# Patient Record
Sex: FEMALE | Race: WHITE | NOT HISPANIC OR LATINO | Employment: FULL TIME | URBAN - NONMETROPOLITAN AREA
[De-identification: names, ages, dates, MRNs, and addresses within clinical notes are randomized per-mention and may not be internally consistent; named-entity substitution may affect disease eponyms.]

---

## 2018-12-10 ENCOUNTER — OFFICE VISIT (OUTPATIENT)
Dept: PULMONOLOGY | Facility: CLINIC | Age: 33
End: 2018-12-10

## 2018-12-10 ENCOUNTER — LAB (OUTPATIENT)
Dept: LAB | Facility: HOSPITAL | Age: 33
End: 2018-12-10
Attending: INTERNAL MEDICINE

## 2018-12-10 VITALS
BODY MASS INDEX: 38.76 KG/M2 | DIASTOLIC BLOOD PRESSURE: 82 MMHG | SYSTOLIC BLOOD PRESSURE: 124 MMHG | OXYGEN SATURATION: 97 % | HEIGHT: 64 IN | HEART RATE: 111 BPM | WEIGHT: 227 LBS | RESPIRATION RATE: 18 BRPM

## 2018-12-10 DIAGNOSIS — J30.89 OTHER ALLERGIC RHINITIS: ICD-10-CM

## 2018-12-10 DIAGNOSIS — R06.02 SHORTNESS OF BREATH: Primary | ICD-10-CM

## 2018-12-10 DIAGNOSIS — J45.40 MODERATE PERSISTENT ASTHMA WITHOUT COMPLICATION: ICD-10-CM

## 2018-12-10 PROCEDURE — 36415 COLL VENOUS BLD VENIPUNCTURE: CPT

## 2018-12-10 PROCEDURE — 86003 ALLG SPEC IGE CRUDE XTRC EA: CPT

## 2018-12-10 PROCEDURE — 99245 OFF/OP CONSLTJ NEW/EST HI 55: CPT | Performed by: INTERNAL MEDICINE

## 2018-12-10 PROCEDURE — 95012 NITRIC OXIDE EXP GAS DETER: CPT | Performed by: INTERNAL MEDICINE

## 2018-12-10 PROCEDURE — 82785 ASSAY OF IGE: CPT

## 2018-12-10 RX ORDER — MONTELUKAST SODIUM 10 MG/1
TABLET ORAL
COMMUNITY
End: 2019-02-22 | Stop reason: SDUPTHER

## 2018-12-10 RX ORDER — BACLOFEN 10 MG/1
TABLET ORAL
Refills: 11 | COMMUNITY
Start: 2018-11-15

## 2018-12-10 RX ORDER — AZELASTINE HYDROCHLORIDE, FLUTICASONE PROPIONATE 137; 50 UG/1; UG/1
1 SPRAY, METERED NASAL DAILY
Qty: 1 BOTTLE | Refills: 5 | Status: SHIPPED | OUTPATIENT
Start: 2018-12-10 | End: 2019-02-22 | Stop reason: SDUPTHER

## 2018-12-10 NOTE — PROGRESS NOTES
CONSULT NOTE    Requested by:   Mckenna Wolf MD      Chief Complaint   Patient presents with   • Consult   • Shortness of Breath   • Asthma       Subjective   Tracy Pena is a 33 y.o. female.     History of Present Illness   Patient comes in today for consultation because of shortness of breath for the past few years    The patient says that her shortness of breath is more pronounced during the evening. It is also usually associated with wheezing. There have been some episodes of cough that usually follow activity.    The patient has pets at home, including 2 dogs and cats. There seems to be a seasonal variation to the symptoms.    She's currently on Dulera but continues to have cough, congestion and wheezing.    She says that dust also makes her symptoms worse.  She is a nonsmoker.  She reports recurrent bronchitis, 4-5 times per year.    Patient also complains of runny nose and dribbling in the back of the throat for the past few weeks. This has been sometimes associated with seasonal variation.    The following portions of the patient's history were reviewed and updated as appropriate: allergies, current medications, past family history, past medical history, past social history and past surgical history.    Review of Systems   Constitutional: Negative for chills, fatigue and fever.   HENT: Positive for ear pain, rhinorrhea, sore throat and voice change. Negative for sinus pressure.    Respiratory: Positive for cough, shortness of breath and wheezing. Negative for chest tightness.    Cardiovascular: Negative for chest pain, palpitations and leg swelling.   Psychiatric/Behavioral: Negative for sleep disturbance.   All other systems reviewed and are negative.      Past Medical History:   Diagnosis Date   • Asthma    • Bronchitis    • Pleurisy    • Pneumonia        Social History     Tobacco Use   • Smoking status: Never Smoker   Substance Use Topics   • Alcohol use: No     Frequency: Never  "        Objective   Visit Vitals  /82   Pulse 111   Resp 18   Ht 162.6 cm (64\")   Wt 103 kg (227 lb)   SpO2 97%   BMI 38.96 kg/m²       Physical Exam   Constitutional: She is oriented to person, place, and time. She appears well-developed.   HENT:   Nostril showed mild erythema.  Oropharynx was cobblestoned & moist.   Eyes: EOM are normal.   Neck: Neck supple. No JVD present.   Cardiovascular: Normal rate and regular rhythm.   Pulmonary/Chest: Effort normal and breath sounds normal. She has no wheezes. She has no rales.   Musculoskeletal:   Gait was normal.   Neurological: She is alert and oriented to person, place, and time.   Skin: Skin is warm and dry.   Psychiatric: She has a normal mood and affect. Her behavior is normal.   Vitals reviewed.      Assessment/Plan   Tracy was seen today for consult, shortness of breath and asthma.    Diagnoses and all orders for this visit:    Shortness of breath  -     Pulmonary Function Test; Future  -     Nitric Oxide  -     Peak Flow    Moderate persistent asthma without complication  -     Pulmonary Function Test; Future  -     Nitric Oxide  -     Peak Flow    Other allergic rhinitis  -     IgE; Future  -     Allergens, Zone 8; Future    Other orders  -     Azelastine-Fluticasone (DYMISTA) 137-50 MCG/ACT suspension; 1 spray into the nostril(s) as directed by provider Daily.  -     Tiotropium Bromide Monohydrate (SPIRIVA RESPIMAT) 1.25 MCG/ACT aerosol solution inhaler; Inhale 2 puffs Daily.         Return in about 10 weeks (around 2/18/2019) for Recheck, PFT on day of F/Up, [place]    DISCUSSION(if any):  I have reviewed the patient's pulmonary function test independently.  They were performed 2016 and was not acceptable for ATS criteria although did suggest obstructive defect, possibly moderate to severe.     Her last hemoglobin was 15.6 with absolute eosinophil count of 110    FeNO level was 18 today.    Peak flow was 275 LPM " today.    Spirometry with bronchodilator response will be obtained upon follow-up visit.    ===========================  ===========================    Patient will be started on nasal spray for symptoms which are definitely consistent with allergic rhinitis.     I have ordered IgE/RAST panel.    I had a detailed discussion with the patient regarding her symptoms which are very suggestive of poorly controlled asthma    She was started on Spiriva with instructions to continue inhaled cortical steroid/long-acting beta agonist    The patient was advised to continueSingulair    Other contributing factors may also need to be treated and this will be discussed with the patient, if and when applicable    Patient was advised to use albuterol based rescue inhaler for when necessary purposes    Patient was also advised to keep a log of the use of rescue inhaler.    She was also advised to keep a close eye on peak flow readings and to maintain record of any significant changes in it.    Patient was given reading material.      Dictated utilizing Dragon dictation.    This document was electronically signed by Woody Albert MD December 10, 2018  3:25 PM

## 2018-12-13 LAB
A ALTERNATA IGE QN: <0.1 KU/L
A FUMIGATUS IGE QN: <0.1 KU/L
AMER ROACH IGE QN: 0.22 KU/L
BAHIA GRASS IGE QN: <0.1 KU/L
BERMUDA GRASS IGE QN: <0.1 KU/L
BOXELDER IGE QN: <0.1 KU/L
C HERBARUM IGE QN: <0.1 KU/L
CAT DANDER IGG QN: <0.1 KU/L
CMN PIGWEED IGE QN: <0.1 KU/L
COMMON RAGWEED IGE QN: <0.1 KU/L
CONV CLASS DESCRIPTION: ABNORMAL
D FARINAE IGE QN: <0.1 KU/L
D PTERONYSS IGE QN: <0.1 KU/L
DOG DANDER IGE QN: <0.1 KU/L
ENGL PLANTAIN IGE QN: <0.1 KU/L
HAZELNUT POLN IGE QN: <0.1 KU/L
JOHNSON GRASS IGE QN: <0.1 KU/L
KENT BLUE GRASS IGE QN: <0.1 KU/L
M RACEMOSUS IGE QN: <0.1 KU/L
MT JUNIPER IGE QN: <0.1 KU/L
MUGWORT IGE QN: <0.1 KU/L
NETTLE IGE QN: <0.1 KU/L
P NOTATUM IGE QN: <0.1 KU/L
S BOTRYOSUM IGE QN: <0.1 KU/L
SHEEP SORREL IGE QN: <0.1 KU/L
SWEET GUM IGE QN: <0.1 KU/L
T011-IGE MAPLE LEAF SYCAMORE: <0.1 KU/L
TOTAL IGE SMQN RAST: 92 IU/ML (ref 0–100)
WHITE ELM IGE QN: <0.1 KU/L
WHITE HICKORY IGE QN: <0.1 KU/L
WHITE MULBERRY IGE QN: <0.1 KU/L
WHITE OAK IGE QN: <0.1 KU/L

## 2019-02-22 ENCOUNTER — OFFICE VISIT (OUTPATIENT)
Dept: PULMONOLOGY | Facility: CLINIC | Age: 34
End: 2019-02-22

## 2019-02-22 VITALS
RESPIRATION RATE: 18 BRPM | BODY MASS INDEX: 39.27 KG/M2 | DIASTOLIC BLOOD PRESSURE: 80 MMHG | HEART RATE: 91 BPM | SYSTOLIC BLOOD PRESSURE: 128 MMHG | OXYGEN SATURATION: 98 % | WEIGHT: 230 LBS | HEIGHT: 64 IN

## 2019-02-22 DIAGNOSIS — R06.02 SOB (SHORTNESS OF BREATH): Primary | ICD-10-CM

## 2019-02-22 DIAGNOSIS — J30.89 OTHER ALLERGIC RHINITIS: Primary | ICD-10-CM

## 2019-02-22 DIAGNOSIS — J45.40 MODERATE PERSISTENT ASTHMA WITHOUT COMPLICATION: ICD-10-CM

## 2019-02-22 DIAGNOSIS — R06.02 SHORTNESS OF BREATH: ICD-10-CM

## 2019-02-22 PROCEDURE — 94060 EVALUATION OF WHEEZING: CPT | Performed by: INTERNAL MEDICINE

## 2019-02-22 PROCEDURE — 99214 OFFICE O/P EST MOD 30 MIN: CPT | Performed by: NURSE PRACTITIONER

## 2019-02-22 RX ORDER — AZELASTINE HYDROCHLORIDE, FLUTICASONE PROPIONATE 137; 50 UG/1; UG/1
1 SPRAY, METERED NASAL DAILY
Qty: 3 BOTTLE | Refills: 1 | Status: SHIPPED | OUTPATIENT
Start: 2019-02-22 | End: 2019-10-08 | Stop reason: SDUPTHER

## 2019-02-22 RX ORDER — MONTELUKAST SODIUM 10 MG/1
10 TABLET ORAL NIGHTLY
Qty: 90 TABLET | Refills: 1 | Status: SHIPPED | OUTPATIENT
Start: 2019-02-22 | End: 2019-08-21 | Stop reason: SDUPTHER

## 2019-08-21 RX ORDER — MONTELUKAST SODIUM 10 MG/1
TABLET ORAL
Qty: 90 TABLET | Refills: 1 | Status: SHIPPED | OUTPATIENT
Start: 2019-08-21 | End: 2019-10-08 | Stop reason: SDUPTHER

## 2019-10-08 ENCOUNTER — OFFICE VISIT (OUTPATIENT)
Dept: PULMONOLOGY | Facility: CLINIC | Age: 34
End: 2019-10-08

## 2019-10-08 VITALS
OXYGEN SATURATION: 98 % | HEART RATE: 100 BPM | DIASTOLIC BLOOD PRESSURE: 84 MMHG | SYSTOLIC BLOOD PRESSURE: 140 MMHG | WEIGHT: 230 LBS | BODY MASS INDEX: 39.46 KG/M2

## 2019-10-08 DIAGNOSIS — R06.02 SOB (SHORTNESS OF BREATH): Primary | ICD-10-CM

## 2019-10-08 DIAGNOSIS — J45.41 MODERATE PERSISTENT ASTHMA WITH EXACERBATION: ICD-10-CM

## 2019-10-08 DIAGNOSIS — J30.89 OTHER ALLERGIC RHINITIS: ICD-10-CM

## 2019-10-08 PROCEDURE — 99214 OFFICE O/P EST MOD 30 MIN: CPT | Performed by: NURSE PRACTITIONER

## 2019-10-08 PROCEDURE — 96372 THER/PROPH/DIAG INJ SC/IM: CPT | Performed by: NURSE PRACTITIONER

## 2019-10-08 RX ORDER — PREDNISONE 20 MG/1
TABLET ORAL
Qty: 10 TABLET | Refills: 0 | Status: SHIPPED | OUTPATIENT
Start: 2019-10-08 | End: 2020-02-10

## 2019-10-08 RX ORDER — METHYLPREDNISOLONE ACETATE 80 MG/ML
80 INJECTION, SUSPENSION INTRA-ARTICULAR; INTRALESIONAL; INTRAMUSCULAR; SOFT TISSUE ONCE
Status: COMPLETED | OUTPATIENT
Start: 2019-10-08 | End: 2019-10-08

## 2019-10-08 RX ORDER — MONTELUKAST SODIUM 10 MG/1
10 TABLET ORAL
Qty: 90 TABLET | Refills: 1 | Status: SHIPPED | OUTPATIENT
Start: 2019-10-08 | End: 2020-03-31 | Stop reason: SDUPTHER

## 2019-10-08 RX ORDER — AZELASTINE HYDROCHLORIDE, FLUTICASONE PROPIONATE 137; 50 UG/1; UG/1
1 SPRAY, METERED NASAL DAILY
Qty: 3 BOTTLE | Refills: 1 | Status: SHIPPED | OUTPATIENT
Start: 2019-10-08 | End: 2020-03-31 | Stop reason: SDUPTHER

## 2019-10-08 RX ADMIN — METHYLPREDNISOLONE ACETATE 80 MG: 80 INJECTION, SUSPENSION INTRA-ARTICULAR; INTRALESIONAL; INTRAMUSCULAR; SOFT TISSUE at 11:26

## 2019-10-08 NOTE — PROGRESS NOTES
Chief Complaint   Patient presents with   • Follow-up   • Asthma         Subjective   Tracy Pena is a 34 y.o. female.     History of Present Illness   The patient comes in today for follow-up of asthma and allergic rhinitis.     She has been using her rescue inhaler more often.  She has been working between [facility] and [place].  It is much more humid in [place] and she has had increased shortness of breath for this reason.    She returned home from [place] about 3 weeks ago and has continued having chest tightness, shortness of breath, and a nonproductive cough.  She denies any fever or chills.  She also states that her office has moved and she is around more papers and boxes.  She has been using her rescue inhaler 2-3 times a day.    She is using Dulera twice a day and Spiriva daily.  She continues to take Singulair at night.    She is using Dymista regularly.    The following portions of the patient's history were reviewed and updated as appropriate: allergies, current medications, past family history, past medical history, past social history and past surgical history.    Review of Systems   Constitutional: Negative for chills and fever.   HENT: Negative for rhinorrhea, sinus pressure and sore throat.    Respiratory: Positive for cough and chest tightness. Negative for shortness of breath and wheezing.    Psychiatric/Behavioral: Negative for sleep disturbance.       Objective   Visit Vitals  /84   Pulse 100   Wt 104 kg (230 lb)   SpO2 98%   BMI 39.46 kg/m²     Physical Exam   Constitutional: She is oriented to person, place, and time.   HENT:   Head: Normocephalic and atraumatic.   Crowded oropharynx.   Eyes: EOM are normal.   Neck: Neck supple.   Cardiovascular: Normal rate and regular rhythm.   Pulmonary/Chest: Effort normal. No respiratory distress.   Somewhat decreased A/E in bases bilaterally without wheezing noted.   Musculoskeletal: She exhibits no edema.   Gait normal.    Neurological: She is alert and oriented to person, place, and time.   Psychiatric: She has a normal mood and affect.   Vitals reviewed.          Assessment/Plan   Tracy was seen today for follow-up and asthma.    Diagnoses and all orders for this visit:    SOB (shortness of breath)  -     Peak Flow    Other allergic rhinitis    Moderate persistent asthma with exacerbation  -     methylPREDNISolone acetate (DEPO-medrol) injection 80 mg    Other orders  -     Azelastine-Fluticasone (DYMISTA) 137-50 MCG/ACT suspension; 1 spray into the nostril(s) as directed by provider Daily.  -     mometasone-formoterol (DULERA) 200-5 MCG/ACT inhaler; Inhale 2 puffs Every 12 (Twelve) Hours.  -     montelukast (SINGULAIR) 10 MG tablet; Take 1 tablet by mouth every night at bedtime.  -     Tiotropium Bromide Monohydrate (SPIRIVA RESPIMAT) 1.25 MCG/ACT aerosol solution inhaler; Inhale 2 puffs Daily.  -     predniSONE (DELTASONE) 20 MG tablet; Take 2 tablets daily for 5 days.           Return in about 5 months (around 3/8/2020) for Recheck, For Dr. Albert.    DISCUSSION (if any):  Peak flow today 250 L/min this is somewhat decreased from her normal.  She has been checking this at home and is noticed that it has been lower than normal as well.    I feel that she is having symptoms of an asthma exacerbation likely due to the climate change between the states.  I have given her a steroid injection as well as 5 days of steroids orally.  I do not feel she needs any antibiotics at this time.  If her symptoms worsen, I have asked her to call the office and let me know.    If she continues to have exacerbations she may benefit from a repeat RAST, IgE, and absolute eosinophils.  Her RAST was positive in the past.    She will need to continue using Dulera, Spiriva, Singulair, and Dymista as directed.    Dictated utilizing Dragon dictation.    This document was electronically signed by BRYANT Spring October 8, 2019  10:34 AM

## 2019-10-09 RX ORDER — ALBUTEROL SULFATE 90 UG/1
2 AEROSOL, METERED RESPIRATORY (INHALATION) EVERY 4 HOURS PRN
Qty: 1 INHALER | Refills: 5 | Status: SHIPPED | OUTPATIENT
Start: 2019-10-09 | End: 2020-03-31 | Stop reason: SDUPTHER

## 2019-10-18 RX ORDER — PREDNISONE 10 MG/1
TABLET ORAL
Qty: 31 TABLET | Refills: 0 | Status: SHIPPED | OUTPATIENT
Start: 2019-10-18 | End: 2020-02-10

## 2020-02-10 ENCOUNTER — LAB (OUTPATIENT)
Dept: LAB | Facility: HOSPITAL | Age: 35
End: 2020-02-10

## 2020-02-10 ENCOUNTER — OFFICE VISIT (OUTPATIENT)
Dept: PULMONOLOGY | Facility: CLINIC | Age: 35
End: 2020-02-10

## 2020-02-10 VITALS
OXYGEN SATURATION: 98 % | SYSTOLIC BLOOD PRESSURE: 122 MMHG | WEIGHT: 232 LBS | DIASTOLIC BLOOD PRESSURE: 80 MMHG | HEART RATE: 104 BPM | HEIGHT: 64 IN | BODY MASS INDEX: 39.61 KG/M2 | RESPIRATION RATE: 18 BRPM

## 2020-02-10 DIAGNOSIS — J45.51 SEVERE PERSISTENT ASTHMA WITH ACUTE EXACERBATION: ICD-10-CM

## 2020-02-10 DIAGNOSIS — R06.02 SOB (SHORTNESS OF BREATH): Primary | ICD-10-CM

## 2020-02-10 DIAGNOSIS — J30.89 OTHER ALLERGIC RHINITIS: ICD-10-CM

## 2020-02-10 LAB
BASOPHILS # BLD AUTO: 0.04 10*3/MM3 (ref 0–0.2)
BASOPHILS NFR BLD AUTO: 0.6 % (ref 0–1.5)
DEPRECATED RDW RBC AUTO: 40.8 FL (ref 37–54)
EOSINOPHIL # BLD AUTO: 0.13 10*3/MM3 (ref 0–0.4)
EOSINOPHIL NFR BLD AUTO: 1.8 % (ref 0.3–6.2)
ERYTHROCYTE [DISTWIDTH] IN BLOOD BY AUTOMATED COUNT: 13.2 % (ref 12.3–15.4)
HCT VFR BLD AUTO: 38.6 % (ref 34–46.6)
HGB BLD-MCNC: 12.8 G/DL (ref 12–15.9)
IMM GRANULOCYTES # BLD AUTO: 0.01 10*3/MM3 (ref 0–0.05)
IMM GRANULOCYTES NFR BLD AUTO: 0.1 % (ref 0–0.5)
LYMPHOCYTES # BLD AUTO: 1.95 10*3/MM3 (ref 0.7–3.1)
LYMPHOCYTES NFR BLD AUTO: 27.5 % (ref 19.6–45.3)
MCH RBC QN AUTO: 27.9 PG (ref 26.6–33)
MCHC RBC AUTO-ENTMCNC: 33.2 G/DL (ref 31.5–35.7)
MCV RBC AUTO: 84.3 FL (ref 79–97)
MONOCYTES # BLD AUTO: 0.51 10*3/MM3 (ref 0.1–0.9)
MONOCYTES NFR BLD AUTO: 7.2 % (ref 5–12)
NEUTROPHILS # BLD AUTO: 4.44 10*3/MM3 (ref 1.7–7)
NEUTROPHILS NFR BLD AUTO: 62.8 % (ref 42.7–76)
NRBC BLD AUTO-RTO: 0 /100 WBC (ref 0–0.2)
PLATELET # BLD AUTO: 306 10*3/MM3 (ref 140–450)
PMV BLD AUTO: 11.4 FL (ref 6–12)
RBC # BLD AUTO: 4.58 10*6/MM3 (ref 3.77–5.28)
WBC NRBC COR # BLD: 7.08 10*3/MM3 (ref 3.4–10.8)

## 2020-02-10 PROCEDURE — 86003 ALLG SPEC IGE CRUDE XTRC EA: CPT

## 2020-02-10 PROCEDURE — 85025 COMPLETE CBC W/AUTO DIFF WBC: CPT

## 2020-02-10 PROCEDURE — 96372 THER/PROPH/DIAG INJ SC/IM: CPT | Performed by: NURSE PRACTITIONER

## 2020-02-10 PROCEDURE — 36415 COLL VENOUS BLD VENIPUNCTURE: CPT

## 2020-02-10 PROCEDURE — 82785 ASSAY OF IGE: CPT

## 2020-02-10 PROCEDURE — 95012 NITRIC OXIDE EXP GAS DETER: CPT | Performed by: NURSE PRACTITIONER

## 2020-02-10 PROCEDURE — 99214 OFFICE O/P EST MOD 30 MIN: CPT | Performed by: NURSE PRACTITIONER

## 2020-02-10 RX ORDER — PREDNISONE 20 MG/1
TABLET ORAL
Qty: 10 TABLET | Refills: 0 | Status: SHIPPED | OUTPATIENT
Start: 2020-02-10 | End: 2020-03-31

## 2020-02-10 RX ORDER — METHYLPREDNISOLONE ACETATE 80 MG/ML
80 INJECTION, SUSPENSION INTRA-ARTICULAR; INTRALESIONAL; INTRAMUSCULAR; SOFT TISSUE ONCE
Status: COMPLETED | OUTPATIENT
Start: 2020-02-10 | End: 2020-02-10

## 2020-02-10 RX ADMIN — METHYLPREDNISOLONE ACETATE 80 MG: 80 INJECTION, SUSPENSION INTRA-ARTICULAR; INTRALESIONAL; INTRAMUSCULAR; SOFT TISSUE at 15:50

## 2020-02-10 NOTE — PROGRESS NOTES
"Chief Complaint   Patient presents with   • Follow-up   • Shortness of Breath         Subjective   Tracy Pena is a 35 y.o. female.     History of Present Illness   Patient is here today for follow up of asthma and shortness of breath.    Patient says that since the last office visit she has had no  Exacerbations since December. she denies any emergency room visits or hospitalizations, due to her asthma. However, she is having worsening symptoms of coughing for the past few of weeks. She seems to cough more at night, but she does not necessarily cough more when she lays down.     She is not traveling as much out of state as she was last year.     The patient says that she is compliant with pulmonary medicines, as prescribed. She is currently using the rescue inhaler twice a day however she was only needing it 1-2 times a week previously. She is using dulera twice a day and Spiriva daily.  She takes Singulair at night.    She is exposed to paper and dust at work but this is not new and there has been no other work changes.    She does not smoke.    The following portions of the patient's history were reviewed and updated as appropriate: allergies, current medications, past family history, past medical history, past social history and past surgical history.    Review of Systems   HENT: Negative for rhinorrhea, sinus pressure, sneezing and sore throat.    Respiratory: Positive for cough and shortness of breath. Negative for wheezing.        Objective   Visit Vitals  /80   Pulse 104   Resp 18   Ht 162.6 cm (64.02\")   Wt 105 kg (232 lb)   SpO2 98%   BMI 39.80 kg/m²     Physical Exam   Constitutional: She is oriented to person, place, and time.   HENT:   Head: Atraumatic.   Mouth/Throat: Oropharynx is clear and moist.   Eyes: EOM are normal.   Neck: Neck supple.   Cardiovascular: Normal rate and regular rhythm.   Pulmonary/Chest: Effort normal and breath sounds normal. No respiratory distress. She has no wheezes. "   Musculoskeletal: She exhibits no edema.   Gait normal.   Neurological: She is alert and oriented to person, place, and time.   Psychiatric: She has a normal mood and affect.   Vitals reviewed.          Assessment/Plan   Tracy was seen today for follow-up and shortness of breath.    Diagnoses and all orders for this visit:    SOB (shortness of breath)    Other allergic rhinitis    Severe persistent asthma with acute exacerbation  -     Nitric Oxide  -     Peak Flow  -     IgE; Future  -     Allergens, Zone 8; Future  -     CBC Auto Differential; Future  -     methylPREDNISolone acetate (DEPO-medrol) injection 80 mg    Other orders  -     predniSONE (DELTASONE) 20 MG tablet; Take 2 tablets daily for 5 days.           Return for keep appt in March with Doc..    DISCUSSION (if any):  Peak flow today 150 liters per minute.  This number is less than what it was at her previous visit.    FeNO today 18 ppb.    her symptoms of asthma are under poor control at this time.  I have started her on steroids and given her a steroid injection as well today.    I have ordered IgE, RAST, and CBC with differential to be completed as soon as possible.  Further changes in the treatment plan will be based on her lab results and discussed with her.    Patient's medications for underlying asthma were reviewed with her in great detail.    Any needed adjustments to her pulmonary medications, either for clinical or insurance coverage reasons, have been made and are reflected in the orders.    Side effects of prescribed medications discussed with the patient.    Asthma action plan with discussed with her.    The patient was asked to call this office if the symptoms worsen.      Dictated utilizing Dragon dictation.    This document was electronically signed by BRYANT Spring February 10, 2020  3:20 PM

## 2020-02-13 LAB
A ALTERNATA IGE QN: <0.1 KU/L
A FUMIGATUS IGE QN: <0.1 KU/L
AMER ROACH IGE QN: 0.16 KU/L
BAHIA GRASS IGE QN: <0.1 KU/L
BERMUDA GRASS IGE QN: <0.1 KU/L
BOXELDER IGE QN: <0.1 KU/L
C HERBARUM IGE QN: <0.1 KU/L
CAT DANDER IGG QN: <0.1 KU/L
CMN PIGWEED IGE QN: <0.1 KU/L
COMMON RAGWEED IGE QN: <0.1 KU/L
CONV CLASS DESCRIPTION: ABNORMAL
D FARINAE IGE QN: <0.1 KU/L
D PTERONYSS IGE QN: <0.1 KU/L
DOG DANDER IGE QN: <0.1 KU/L
ENGL PLANTAIN IGE QN: <0.1 KU/L
HAZELNUT POLN IGE QN: <0.1 KU/L
JOHNSON GRASS IGE QN: <0.1 KU/L
KENT BLUE GRASS IGE QN: <0.1 KU/L
M RACEMOSUS IGE QN: <0.1 KU/L
MT JUNIPER IGE QN: <0.1 KU/L
MUGWORT IGE QN: <0.1 KU/L
NETTLE IGE QN: <0.1 KU/L
P NOTATUM IGE QN: <0.1 KU/L
S BOTRYOSUM IGE QN: <0.1 KU/L
SHEEP SORREL IGE QN: <0.1 KU/L
SWEET GUM IGE QN: <0.1 KU/L
T011-IGE MAPLE LEAF SYCAMORE: <0.1 KU/L
TOTAL IGE SMQN RAST: 101 IU/ML (ref 6–495)
WHITE ELM IGE QN: <0.1 KU/L
WHITE HICKORY IGE QN: <0.1 KU/L
WHITE MULBERRY IGE QN: <0.1 KU/L
WHITE OAK IGE QN: <0.1 KU/L

## 2020-02-14 NOTE — PROGRESS NOTES
Please call the patient regarding her labs. She has slightly elevated IgE and absolute eosinophils. She is allergic to cockroaches. We can discuss one of the injectables if her symptoms are not improving with the change in medication that was made at her last visit.

## 2020-03-31 ENCOUNTER — TELEMEDICINE (OUTPATIENT)
Dept: PULMONOLOGY | Facility: CLINIC | Age: 35
End: 2020-03-31

## 2020-03-31 ENCOUNTER — E-VISIT (OUTPATIENT)
Dept: FAMILY MEDICINE CLINIC | Facility: TELEHEALTH | Age: 35
End: 2020-03-31

## 2020-03-31 DIAGNOSIS — R06.02 SHORTNESS OF BREATH: ICD-10-CM

## 2020-03-31 DIAGNOSIS — J30.89 OTHER ALLERGIC RHINITIS: Primary | ICD-10-CM

## 2020-03-31 DIAGNOSIS — J45.50 SEVERE PERSISTENT ASTHMA WITHOUT COMPLICATION: ICD-10-CM

## 2020-03-31 PROBLEM — J45.909 ASTHMA: Status: ACTIVE | Noted: 2018-01-01

## 2020-03-31 PROCEDURE — 99214 OFFICE O/P EST MOD 30 MIN: CPT | Performed by: NURSE PRACTITIONER

## 2020-03-31 RX ORDER — MONTELUKAST SODIUM 10 MG/1
10 TABLET ORAL
Qty: 90 TABLET | Refills: 1 | Status: SHIPPED | OUTPATIENT
Start: 2020-03-31 | End: 2020-04-20

## 2020-03-31 RX ORDER — ALBUTEROL SULFATE 90 UG/1
2 AEROSOL, METERED RESPIRATORY (INHALATION) EVERY 4 HOURS PRN
Qty: 1 INHALER | Refills: 5 | Status: SHIPPED | OUTPATIENT
Start: 2020-03-31 | End: 2020-08-26 | Stop reason: SDUPTHER

## 2020-03-31 RX ORDER — AZELASTINE HYDROCHLORIDE, FLUTICASONE PROPIONATE 137; 50 UG/1; UG/1
1 SPRAY, METERED NASAL DAILY
Qty: 3 BOTTLE | Refills: 1 | Status: SHIPPED | OUTPATIENT
Start: 2020-03-31 | End: 2020-08-26 | Stop reason: SDUPTHER

## 2020-03-31 NOTE — PROGRESS NOTES
Chief Complaint   Patient presents with   • Follow-up   • Shortness of Breath         Subjective   Tracy Pena is a 35 y.o. female.     History of Present Illness   The patient is being seen today due to persistent shortness of breath and asthma.    She was treated for an asthma exacerbation in February.  The cough has improved however she is continued to be very short of breath and needed her rescue inhaler 2-3 times a day on a regular basis.  Previously she was using it a couple of times a week.     At her last visit, I had treated her with oral prednisone as well as a steroid injection the day she was seen and this seemed to have minimal effect on improving her breathing.  The cough has improved however she continues to have a cough which is nonproductive.    She has continued to use Dulera twice a day and Spiriva daily.  She takes Singulair at night.    She is using Dymista on a regular basis and feels this medication has helped with nasal drainage.    She is working from home at this time as required by her company and feels this has helped her breathing.  If she had been going to work every day among the papers and dust which is in the office, she feels her breathing would be worse.    Review of Systems   Constitutional: Negative for fever.   HENT: Positive for rhinorrhea.    Respiratory: Positive for shortness of breath and wheezing.        Objective   There were no vitals taken for this visit.  Physical Exam  This was a video visit.      Assessment/Plan   Tracy was seen today for follow-up and shortness of breath.    Diagnoses and all orders for this visit:    Other allergic rhinitis    Severe persistent asthma without complication  -     Benralizumab solution prefilled syringe 30 mg    Shortness of breath    Other orders  -     Fluticasone Furoate-Vilanterol (Breo Ellipta) 200-25 MCG/INH inhaler; Inhale 1 puff Daily. Rinse mouth with water after use.  -     albuterol sulfate  (90 Base) MCG/ACT  inhaler; Inhale 2 puffs Every 4 (Four) Hours As Needed for Wheezing.  -     Azelastine-Fluticasone (Dymista) 137-50 MCG/ACT suspension; 1 spray into the nostril(s) as directed by provider Daily.  -     montelukast (SINGULAIR) 10 MG tablet; Take 1 tablet by mouth every night at bedtime.  -     Tiotropium Bromide Monohydrate (Spiriva Respimat) 1.25 MCG/ACT aerosol solution inhaler; Inhale 2 puffs Daily.           Return in about 5 months (around 8/31/2020) for Recheck, For Dr. Albert.    DISCUSSION (if any):  I reviewed her lab work and discussed those results with her.  Absolute eosinophils 130.  IgE 101.  RAST positive to cockroach.    I have given her a sample of Breo at her last visit to see if this medication worked any better than Dulera.  She used a sample of Breo and felt that it did work somewhat better than Dulera.  I will send her in a prescription for Breo and we will see if it is covered by her insurance company.  I have asked her to continue taking Singulair, Spiriva, Dymista, and the rescue medication as directed.    She did not seem to have a significant response to steroids so I will not put her on those at this time.    However, given her elevation in absolute eosinophils and persistent asthma symptoms despite being on maximum inhaled therapy I feel she would benefit from Fasenra.  We have discussed this medication and the possible side effects. She is willing to try this medication.  We will try to get it approved through her insurance company for home injection.    I will have the staff mail patient education on Fasenra and any forms that and may need to be signed by the patient in order for us to start the medication approval process.      Dictated utilizing Dragon dictation.    This document was electronically signed by BRYANT Spring March 31, 2020  13:39

## 2020-03-31 NOTE — PROGRESS NOTES
This is  Dania Miranda, Family Nurse Practitioner.  I will cancel this Evisit.    Thank you for responding back.  May you feel better soon.    This encounter was created in error - please disregard.

## 2020-04-20 RX ORDER — MONTELUKAST SODIUM 10 MG/1
TABLET ORAL
Qty: 90 TABLET | Refills: 1 | Status: SHIPPED | OUTPATIENT
Start: 2020-04-20 | End: 2020-08-26 | Stop reason: SDUPTHER

## 2020-05-15 ENCOUNTER — TRANSCRIBE ORDERS (OUTPATIENT)
Dept: ADMINISTRATIVE | Facility: HOSPITAL | Age: 35
End: 2020-05-15

## 2020-05-15 DIAGNOSIS — Z01.818 PRE-OPERATIVE CLEARANCE: Primary | ICD-10-CM

## 2020-05-18 ENCOUNTER — LAB (OUTPATIENT)
Dept: LAB | Facility: HOSPITAL | Age: 35
End: 2020-05-18

## 2020-05-18 DIAGNOSIS — Z01.818 PRE-OPERATIVE CLEARANCE: ICD-10-CM

## 2020-05-18 LAB
REF LAB TEST METHOD: NORMAL
SARS-COV-2 RNA RESP QL NAA+PROBE: NOT DETECTED

## 2020-05-18 PROCEDURE — U0004 COV-19 TEST NON-CDC HGH THRU: HCPCS

## 2020-05-18 PROCEDURE — U0002 COVID-19 LAB TEST NON-CDC: HCPCS

## 2020-08-26 ENCOUNTER — OFFICE VISIT (OUTPATIENT)
Dept: PULMONOLOGY | Facility: CLINIC | Age: 35
End: 2020-08-26

## 2020-08-26 VITALS
WEIGHT: 229 LBS | SYSTOLIC BLOOD PRESSURE: 140 MMHG | DIASTOLIC BLOOD PRESSURE: 98 MMHG | TEMPERATURE: 97.8 F | RESPIRATION RATE: 18 BRPM | HEART RATE: 89 BPM | HEIGHT: 64 IN | OXYGEN SATURATION: 98 % | BODY MASS INDEX: 39.09 KG/M2

## 2020-08-26 DIAGNOSIS — J30.89 OTHER ALLERGIC RHINITIS: ICD-10-CM

## 2020-08-26 DIAGNOSIS — J45.50 SEVERE PERSISTENT ASTHMA WITHOUT COMPLICATION: ICD-10-CM

## 2020-08-26 DIAGNOSIS — R06.02 SHORTNESS OF BREATH: Primary | ICD-10-CM

## 2020-08-26 PROCEDURE — 95012 NITRIC OXIDE EXP GAS DETER: CPT | Performed by: INTERNAL MEDICINE

## 2020-08-26 PROCEDURE — 99214 OFFICE O/P EST MOD 30 MIN: CPT | Performed by: INTERNAL MEDICINE

## 2020-08-26 RX ORDER — ALBUTEROL SULFATE 90 UG/1
2 AEROSOL, METERED RESPIRATORY (INHALATION) EVERY 4 HOURS PRN
Qty: 18 G | Refills: 3 | Status: SHIPPED | OUTPATIENT
Start: 2020-08-26 | End: 2021-11-19 | Stop reason: SDUPTHER

## 2020-08-26 RX ORDER — MONTELUKAST SODIUM 10 MG/1
10 TABLET ORAL
Qty: 90 TABLET | Refills: 2 | Status: SHIPPED | OUTPATIENT
Start: 2020-08-26 | End: 2020-12-11 | Stop reason: SDUPTHER

## 2020-08-26 RX ORDER — AZELASTINE HYDROCHLORIDE, FLUTICASONE PROPIONATE 137; 50 UG/1; UG/1
1 SPRAY, METERED NASAL DAILY
Qty: 3 BOTTLE | Refills: 2 | Status: SHIPPED | OUTPATIENT
Start: 2020-08-26 | End: 2020-12-11 | Stop reason: SDUPTHER

## 2020-08-26 NOTE — PROGRESS NOTES
"Chief Complaint   Patient presents with   • Follow-up   • Breathing Problem       Subjective   Tracy Pena is a 35 y.o. female.     History of Present Illness   Patient was evaluated today for follow up of asthma, allergic rhinitis and shortness of breath. Patient does not report any recent exacerbations requiring emergency room visits or hospitalizations.    Patient is using the rescue inhalers minimally. she is compliant with pulmonary medicines, as prescribed.    Patient says that she has been using her nasal sprays on a regular basis and describes no significant ongoing issues other than occasional congestion.     Last use of Steroids was many months ago. (Feb 2020)       The following portions of the patient's history were reviewed and updated as appropriate: allergies, current medications, past family history, past medical history, past social history and past surgical history.    Review of Systems   HENT: Positive for sore throat. Negative for trouble swallowing.    Respiratory: Positive for cough and shortness of breath. Negative for wheezing.        Objective   Visit Vitals  /98   Pulse 89   Temp 97.8 °F (36.6 °C)   Resp 18   Ht 162.6 cm (64.02\")   Wt 104 kg (229 lb)   SpO2 98%   BMI 39.29 kg/m²       Physical Exam   Constitutional: She is oriented to person, place, and time. She appears well-developed.   HENT:   Nostril showed mild erythema.  Oropharynx was cobblestoned & moist.   Eyes: EOM are normal.   Neck: Neck supple. No JVD present.   Cardiovascular: Normal rate and regular rhythm.   Pulmonary/Chest: Effort normal and breath sounds normal. She has no wheezes. She has no rales.   Musculoskeletal:   Gait was normal.   Neurological: She is alert and oriented to person, place, and time.   Skin: Skin is warm and dry.   Psychiatric: She has a normal mood and affect. Her behavior is normal.   Vitals reviewed.        Assessment/Plan   Tracy was seen today for follow-up and breathing " problem.    Diagnoses and all orders for this visit:    Shortness of breath  -     Pulmonary Function Test; Future  -     Nitric Oxide  -     Peak Flow    Severe persistent asthma without complication  -     Pulmonary Function Test; Future  -     Nitric Oxide  -     Peak Flow    Other allergic rhinitis    Other orders  -     Fluticasone Furoate-Vilanterol (Breo Ellipta) 200-25 MCG/INH inhaler; Inhale 1 puff Daily. Rinse mouth with water after use.  -     Tiotropium Bromide Monohydrate (Spiriva Respimat) 1.25 MCG/ACT aerosol solution inhaler; Inhale 2 puffs Daily.  -     montelukast (SINGULAIR) 10 MG tablet; Take 1 tablet by mouth every night at bedtime.  -     albuterol sulfate  (90 Base) MCG/ACT inhaler; Inhale 2 puffs Every 4 (Four) Hours As Needed for Wheezing.  -     Azelastine-Fluticasone (Dymista) 137-50 MCG/ACT suspension; 1 spray into the nostril(s) as directed by provider Daily.           Return in about 3 months (around 11/26/2020) for Recheck, PFT F/U, For Brittney, In [place], ....Also 7 mths w/ Dr. Albert ([place]).    DISCUSSION (if any):  FeNO level was 9 today.    Peak flow was 370 LPM today.    PFTs were reviewed. Poorly performed but last FEV1 was 84%.    Laboratory workup was also reviewed which showed   Lab Results   Component Value Date    EOSABS 0.13 02/10/2020    &  Lab Results   Component Value Date     02/10/2020    IGE 92 12/10/2018     ===========================  ===========================    It appears that her symptoms of asthma are under adequate control with the current regimen.    Patient's medications for underlying asthma were reviewed in great detail.    Patient was advised to continue her nasal spray, especially given improvement in symptoms overall.    Patient was informed about the black box warning for Singulair regarding suicidal thoughts and tendencies.  she denies any ongoing issues for now.     Any needed adjustments to her pulmonary medications, either for  clinical or insurance coverage reasons, have been made and are reflected in the orders.    Compliance with medications stressed.     Side effects of prescribed medications discussed with the patient.    I have discussed asthma action plan with her.    The patient was asked to call this office if the symptoms worsen.    Given the fact that her symptoms have been under good control since she has been on optimal medications and has not had any further exacerbations, we will hold Fasenra for now.     I told her that 1 of the things to consider in any of these medications is the fact that it is not known if these medications can be stopped in the future and since this will most likely be a lifelong therapy, we will reserve it to be used in a situation where either her asthma is poorly controlled or she has repeated exacerbations or her lung functions worsen.    I also reminded her that some of her spirometry's were poorly performed and as such, cannot be relied upon to follow progression of disease.    It is heartening to note however, that her peak flow has improved significantly.    She was asked to follow her peak flows regularly and document it on a diary or calendar.      Dictated utilizing Dragon dictation.    This document was electronically signed by Woody Albert MD on 08/26/20 at 09:52

## 2020-12-11 ENCOUNTER — OFFICE VISIT (OUTPATIENT)
Dept: PULMONOLOGY | Facility: CLINIC | Age: 35
End: 2020-12-11

## 2020-12-11 VITALS
WEIGHT: 224 LBS | BODY MASS INDEX: 38.24 KG/M2 | TEMPERATURE: 97.1 F | HEART RATE: 86 BPM | OXYGEN SATURATION: 98 % | SYSTOLIC BLOOD PRESSURE: 124 MMHG | RESPIRATION RATE: 18 BRPM | DIASTOLIC BLOOD PRESSURE: 72 MMHG | HEIGHT: 64 IN

## 2020-12-11 DIAGNOSIS — R06.02 SHORTNESS OF BREATH: ICD-10-CM

## 2020-12-11 DIAGNOSIS — J45.50 SEVERE PERSISTENT ASTHMA WITHOUT COMPLICATION: ICD-10-CM

## 2020-12-11 DIAGNOSIS — J30.89 OTHER ALLERGIC RHINITIS: ICD-10-CM

## 2020-12-11 DIAGNOSIS — J45.50 SEVERE PERSISTENT ASTHMA WITHOUT COMPLICATION: Primary | ICD-10-CM

## 2020-12-11 PROCEDURE — 99214 OFFICE O/P EST MOD 30 MIN: CPT | Performed by: NURSE PRACTITIONER

## 2020-12-11 PROCEDURE — 94060 EVALUATION OF WHEEZING: CPT | Performed by: INTERNAL MEDICINE

## 2020-12-11 RX ORDER — TIOTROPIUM BROMIDE INHALATION SPRAY 1.56 UG/1
2 SPRAY, METERED RESPIRATORY (INHALATION) DAILY
Qty: 3 INHALER | Refills: 2 | Status: SHIPPED | OUTPATIENT
Start: 2020-12-11 | End: 2021-03-26

## 2020-12-11 RX ORDER — MONTELUKAST SODIUM 10 MG/1
10 TABLET ORAL
Qty: 90 TABLET | Refills: 2 | Status: SHIPPED | OUTPATIENT
Start: 2020-12-11 | End: 2021-08-23

## 2020-12-11 RX ORDER — AZELASTINE HYDROCHLORIDE, FLUTICASONE PROPIONATE 137; 50 UG/1; UG/1
1 SPRAY, METERED NASAL DAILY
Qty: 3 BOTTLE | Refills: 2 | Status: SHIPPED | OUTPATIENT
Start: 2020-12-11 | End: 2021-03-26

## 2020-12-11 RX ORDER — ALBUTEROL SULFATE 2.5 MG/3ML
2.5 SOLUTION RESPIRATORY (INHALATION) 4 TIMES DAILY PRN
Qty: 125 VIAL | Refills: 5 | Status: SHIPPED | OUTPATIENT
Start: 2020-12-11

## 2020-12-11 NOTE — PROGRESS NOTES
"Chief Complaint   Patient presents with   • Follow-up   • Shortness of Breath         Subjective   Tracy Pena is a 35 y.o. female.     History of Present Illness   Patient is here today for follow up of asthma and shortness of breath.     Patient says that since the last office visit she has had no recent exacerbations. she denies any emergency room visits or hospitalizations, due to her asthma.     The patient says that she is compliant with pulmonary medicines, as prescribed.  She is  using Breo and Spiriva both daily.  She feels this inhaler combination has worked better.    Currently she uses the rescue inhaler once every 2 days on average.  She states when she goes into the office she has to use it more often for example yesterday she had to go into the office for about 5 hours and used it twice while there.    She states she has been able to do her normal activity without difficulty.  However she is working from home at this time and does not going into her office or traveling to [place] as she was previously.    Fasenra was previously ordered however she and Dr. Albert discussed it and decided to wait on starting this medication since she had not had any exacerbations and seemed to be doing better with the Breo and Spiriva combination.    She does report some wheezing mainly when she is out in public and having to wear a mask.      The following portions of the patient's history were reviewed and updated as appropriate: allergies, current medications, past family history, past medical history, past social history and past surgical history.    Review of Systems   HENT: Positive for sore throat. Negative for sinus pressure and sneezing.    Respiratory: Positive for cough and shortness of breath. Negative for chest tightness and wheezing.        Objective   Visit Vitals  /72   Pulse 86   Temp 97.1 °F (36.2 °C)   Resp 18   Ht 162.6 cm (64.02\")   Wt 102 kg (224 lb)   SpO2 98%   BMI 38.43 kg/m² "         Physical Exam  Vitals signs reviewed.   HENT:      Head: Atraumatic.      Mouth/Throat:      Mouth: Mucous membranes are moist.      Pharynx: Oropharynx is clear.   Eyes:      Extraocular Movements: Extraocular movements intact.   Neck:      Musculoskeletal: Neck supple.   Cardiovascular:      Rate and Rhythm: Normal rate and regular rhythm.   Pulmonary:      Effort: Pulmonary effort is normal. No respiratory distress.      Breath sounds: No wheezing.   Musculoskeletal:      Comments: Gait normal.   Skin:     General: Skin is warm.   Neurological:      Mental Status: She is alert and oriented to person, place, and time.   Psychiatric:         Mood and Affect: Mood normal.             Assessment/Plan   Diagnoses and all orders for this visit:    1. Severe persistent asthma without complication (Primary)    2. Other allergic rhinitis    3. Shortness of breath    Other orders  -     albuterol (PROVENTIL) (2.5 MG/3ML) 0.083% nebulizer solution; Take 2.5 mg by nebulization 4 (Four) Times a Day As Needed for Wheezing.  Dispense: 125 vial; Refill: 5  -     Tiotropium Bromide Monohydrate (Spiriva Respimat) 1.25 MCG/ACT aerosol solution inhaler; Inhale 2 puffs Daily.  Dispense: 3 inhaler; Refill: 2  -     montelukast (SINGULAIR) 10 MG tablet; Take 1 tablet by mouth every night at bedtime.  Dispense: 90 tablet; Refill: 2  -     Fluticasone Furoate-Vilanterol (Breo Ellipta) 200-25 MCG/INH inhaler; Inhale 1 puff Daily. Rinse mouth with water after use.  Dispense: 3 each; Refill: 2  -     Azelastine-Fluticasone (Dymista) 137-50 MCG/ACT suspension; 1 spray into the nostril(s) as directed by provider Daily.  Dispense: 3 bottle; Refill: 2           Return for keep appt in March.    DISCUSSION (if any):  I reviewed her spirometry today and discussed the results with her.  Unfortunately the spirometry results were difficult to read due to the print out.  It appears the spirometry is somewhat better than her previous.    Her  symptoms of asthma are under adequate control at this time.  She has had no exacerbations since her last visit and is not needing the rescue inhaler every day.  I have asked her to continue using Breo, Spiriva, Singulair, and the rescue medication all as directed.    Patient's medications for underlying asthma were reviewed with her in great detail.    Any needed adjustments to her pulmonary medications, either for clinical or insurance coverage reasons, have been made and are reflected in the orders.    Side effects of prescribed medications discussed with the patient.    Asthma action plan with discussed with her.    The patient was asked to call this office if the symptoms worsen.    She has had a flu vaccine this fall.    Dictated utilizing Dragon dictation.    This document was electronically signed by BRYANT Spring December 11, 2020  10:13 EST

## 2021-03-04 ENCOUNTER — LAB (OUTPATIENT)
Dept: LAB | Facility: HOSPITAL | Age: 36
End: 2021-03-04

## 2021-03-04 ENCOUNTER — TRANSCRIBE ORDERS (OUTPATIENT)
Dept: LAB | Facility: HOSPITAL | Age: 36
End: 2021-03-04

## 2021-03-04 DIAGNOSIS — R73.09 IMPAIRED GLUCOSE TOLERANCE TEST: ICD-10-CM

## 2021-03-04 DIAGNOSIS — Z87.898 PERSONAL HISTORY OF OTHER LYMPHATIC AND HEMATOPOIETIC NEOPLASM: ICD-10-CM

## 2021-03-04 DIAGNOSIS — Z01.818 OTHER SPECIFIED PRE-OPERATIVE EXAMINATION: ICD-10-CM

## 2021-03-04 DIAGNOSIS — Z87.898 PERSONAL HISTORY OF OTHER LYMPHATIC AND HEMATOPOIETIC NEOPLASM: Primary | ICD-10-CM

## 2021-03-04 LAB
ANION GAP SERPL CALCULATED.3IONS-SCNC: 7.9 MMOL/L (ref 5–15)
BASOPHILS # BLD AUTO: 0.03 10*3/MM3 (ref 0–0.2)
BASOPHILS NFR BLD AUTO: 0.5 % (ref 0–1.5)
BUN SERPL-MCNC: 7 MG/DL (ref 6–20)
BUN/CREAT SERPL: 9.3 (ref 7–25)
CALCIUM SPEC-SCNC: 9.1 MG/DL (ref 8.6–10.5)
CHLORIDE SERPL-SCNC: 104 MMOL/L (ref 98–107)
CO2 SERPL-SCNC: 27.1 MMOL/L (ref 22–29)
CREAT SERPL-MCNC: 0.75 MG/DL (ref 0.57–1)
DEPRECATED RDW RBC AUTO: 40.7 FL (ref 37–54)
EOSINOPHIL # BLD AUTO: 0.16 10*3/MM3 (ref 0–0.4)
EOSINOPHIL NFR BLD AUTO: 2.6 % (ref 0.3–6.2)
ERYTHROCYTE [DISTWIDTH] IN BLOOD BY AUTOMATED COUNT: 13.2 % (ref 12.3–15.4)
GFR SERPL CREATININE-BSD FRML MDRD: 86 ML/MIN/1.73
GLUCOSE SERPL-MCNC: 193 MG/DL (ref 65–99)
HBA1C MFR BLD: 6.28 % (ref 4.8–5.6)
HCT VFR BLD AUTO: 40 % (ref 34–46.6)
HGB BLD-MCNC: 12.9 G/DL (ref 12–15.9)
IMM GRANULOCYTES # BLD AUTO: 0.01 10*3/MM3 (ref 0–0.05)
IMM GRANULOCYTES NFR BLD AUTO: 0.2 % (ref 0–0.5)
LYMPHOCYTES # BLD AUTO: 1.81 10*3/MM3 (ref 0.7–3.1)
LYMPHOCYTES NFR BLD AUTO: 29.6 % (ref 19.6–45.3)
MCH RBC QN AUTO: 27.5 PG (ref 26.6–33)
MCHC RBC AUTO-ENTMCNC: 32.3 G/DL (ref 31.5–35.7)
MCV RBC AUTO: 85.3 FL (ref 79–97)
MONOCYTES # BLD AUTO: 0.48 10*3/MM3 (ref 0.1–0.9)
MONOCYTES NFR BLD AUTO: 7.9 % (ref 5–12)
NEUTROPHILS NFR BLD AUTO: 3.62 10*3/MM3 (ref 1.7–7)
NEUTROPHILS NFR BLD AUTO: 59.2 % (ref 42.7–76)
NRBC BLD AUTO-RTO: 0 /100 WBC (ref 0–0.2)
PLATELET # BLD AUTO: 254 10*3/MM3 (ref 140–450)
PMV BLD AUTO: 11.7 FL (ref 6–12)
POTASSIUM SERPL-SCNC: 3.9 MMOL/L (ref 3.5–5.2)
RBC # BLD AUTO: 4.69 10*6/MM3 (ref 3.77–5.28)
SODIUM SERPL-SCNC: 139 MMOL/L (ref 136–145)
WBC # BLD AUTO: 6.11 10*3/MM3 (ref 3.4–10.8)

## 2021-03-04 PROCEDURE — 80048 BASIC METABOLIC PNL TOTAL CA: CPT

## 2021-03-04 PROCEDURE — 85025 COMPLETE CBC W/AUTO DIFF WBC: CPT

## 2021-03-04 PROCEDURE — 83036 HEMOGLOBIN GLYCOSYLATED A1C: CPT

## 2021-03-04 PROCEDURE — 36415 COLL VENOUS BLD VENIPUNCTURE: CPT

## 2021-03-15 ENCOUNTER — APPOINTMENT (OUTPATIENT)
Dept: PREADMISSION TESTING | Facility: HOSPITAL | Age: 36
End: 2021-03-15

## 2021-03-15 LAB — SARS-COV-2 RNA NOSE QL NAA+PROBE: NOT DETECTED

## 2021-03-15 PROCEDURE — C9803 HOPD COVID-19 SPEC COLLECT: HCPCS

## 2021-03-15 PROCEDURE — U0004 COV-19 TEST NON-CDC HGH THRU: HCPCS

## 2021-03-18 ENCOUNTER — BULK ORDERING (OUTPATIENT)
Dept: CASE MANAGEMENT | Facility: OTHER | Age: 36
End: 2021-03-18

## 2021-03-18 DIAGNOSIS — Z23 IMMUNIZATION DUE: ICD-10-CM

## 2021-03-26 ENCOUNTER — OFFICE VISIT (OUTPATIENT)
Dept: PULMONOLOGY | Facility: CLINIC | Age: 36
End: 2021-03-26

## 2021-03-26 VITALS
SYSTOLIC BLOOD PRESSURE: 124 MMHG | DIASTOLIC BLOOD PRESSURE: 84 MMHG | WEIGHT: 232 LBS | HEIGHT: 64 IN | RESPIRATION RATE: 16 BRPM | BODY MASS INDEX: 39.61 KG/M2 | HEART RATE: 87 BPM | OXYGEN SATURATION: 99 %

## 2021-03-26 DIAGNOSIS — J30.89 OTHER ALLERGIC RHINITIS: ICD-10-CM

## 2021-03-26 DIAGNOSIS — J45.50 SEVERE PERSISTENT OCCUPATIONAL ASTHMA WITHOUT COMPLICATION: ICD-10-CM

## 2021-03-26 DIAGNOSIS — J45.50 SEVERE PERSISTENT ASTHMA WITHOUT COMPLICATION: ICD-10-CM

## 2021-03-26 DIAGNOSIS — R06.02 SHORTNESS OF BREATH: Primary | ICD-10-CM

## 2021-03-26 DIAGNOSIS — Z57.9 SEVERE PERSISTENT OCCUPATIONAL ASTHMA WITHOUT COMPLICATION: ICD-10-CM

## 2021-03-26 PROCEDURE — 99214 OFFICE O/P EST MOD 30 MIN: CPT | Performed by: INTERNAL MEDICINE

## 2021-03-26 RX ORDER — OXYCODONE HYDROCHLORIDE 5 MG/1
TABLET ORAL
COMMUNITY
Start: 2021-03-17

## 2021-03-26 RX ORDER — AZELASTINE 1 MG/ML
1 SPRAY, METERED NASAL 2 TIMES DAILY PRN
Qty: 3 EACH | Refills: 2 | Status: SHIPPED | OUTPATIENT
Start: 2021-03-26 | End: 2021-11-19 | Stop reason: SDUPTHER

## 2021-03-26 RX ORDER — GALCANEZUMAB 120 MG/ML
INJECTION, SOLUTION SUBCUTANEOUS
COMMUNITY
Start: 2021-02-26

## 2021-03-26 RX ORDER — FLUTICASONE PROPIONATE 50 MCG
1 SPRAY, SUSPENSION (ML) NASAL DAILY
Qty: 48 G | Refills: 3 | Status: SHIPPED | OUTPATIENT
Start: 2021-03-26

## 2021-03-26 SDOH — HEALTH STABILITY - PHYSICAL HEALTH: OCCUPATIONAL EXPOSURE TO UNSPECIFIED RISK FACTOR: Z57.9

## 2021-03-26 NOTE — PROGRESS NOTES
"  Chief Complaint   Patient presents with   • Follow-up   • Shortness of Breath       Subjective   Tracy Pena is a 36 y.o. female.     History of Present Illness   Patient was evaluated today for follow up of asthma, allergic rhinitis and shortness of breath. Patient does not report any recent exacerbations requiring emergency room visits or hospitalizations.    Patient is using the rescue inhalers minimally. she is compliant with pulmonary medicines, as prescribed.    She started going back to work 2 times a week and had needed rescue inhaler more often with worse symptoms those evenings that she worked.     Patient says that she has been using her nasal sprays on a regular basis and describes no significant ongoing issues other than occasional congestion.       The following portions of the patient's history were reviewed and updated as appropriate: allergies, current medications, past family history, past medical history, past social history and past surgical history.    Review of Systems   Constitutional: Negative for chills and fever.   HENT: Negative for rhinorrhea, sinus pressure, sneezing and sore throat.    Respiratory: Positive for cough. Negative for shortness of breath and wheezing.    Psychiatric/Behavioral: Negative for sleep disturbance.       Objective   Visit Vitals  /84   Pulse 87   Resp 16   Ht 162.6 cm (64.02\")   Wt 105 kg (232 lb)   SpO2 99%   BMI 39.80 kg/m²       Physical Exam  Vitals reviewed.   Constitutional:       Appearance: She is well-developed.   Neck:      Vascular: No JVD.   Cardiovascular:      Rate and Rhythm: Normal rate and regular rhythm.   Pulmonary:      Effort: Pulmonary effort is normal.      Breath sounds: Normal breath sounds. No wheezing.   Musculoskeletal:      Cervical back: Neck supple.      Comments: Gait was normal.   Skin:     General: Skin is warm and dry.   Neurological:      Mental Status: She is alert and oriented to person, place, and time. "         Assessment/Plan   Diagnoses and all orders for this visit:    1. Shortness of breath (Primary)    2. Severe persistent asthma without complication    3. Other allergic rhinitis    4. Severe persistent occupational asthma without complication  Comments:  Work exacerbates asthma.     Other orders  -     azelastine (ASTELIN) 0.1 % nasal spray; 1 spray into the nostril(s) as directed by provider 2 (Two) Times a Day As Needed for Rhinitis or Allergies. Use in each nostril as directed  Dispense: 3 each; Refill: 2  -     fluticasone (FLONASE) 50 MCG/ACT nasal spray; 1 spray into the nostril(s) as directed by provider Daily. Administer 1 spray in each nostril for each dose.  Dispense: 48 g; Refill: 3  -     Fluticasone-Umeclidin-Vilant (Trelegy Ellipta) 200-62.5-25 MCG/INH aerosol powder ; Inhale 1 puff Daily. Rinse mouth with water after use.  Dispense: 180 each; Refill: 3           Return in about 4 months (around 7/26/2021) for FeNO F/U, For Brittney ([place])....Also 8 mths w/ Dr. Albert ([place]).    DISCUSSION (if any):  It appears that her symptoms of asthma are under adequate control with the current regimen.    I feel that the patient has work exacerbated asthma rather than true work induced asthma.    Patient's medications for underlying asthma were reviewed in great detail.    Patient was informed about the black box warning for Singulair regarding suicidal thoughts and tendencies.  she denies any ongoing issues for now.     Will start Trelegy instead of Breo and Spiriva.     Compliance with medications stressed.     Side effects of prescribed medications discussed with the patient.    The need to continue to be aware of triggers that may cause asthma exacerbation versus progression of disease, was also discussed.    Patient was advised to continue her nasal spray, especially given improvement in symptoms overall.    Will give separate prescriptions for Azelastine and Flonase, as I am not sure which one is  causing her to have anosmia.     I have discussed asthma action plan with her.    The patient was asked to call this office if the symptoms worsen.    Since she has done well with optimization of her meds, we will not consider Fasenra for now but this may need to considered at a later date, if she has worsening symptoms.    FeNo upon follow up.     The patient will benefit from avoiding exposure to david environments at work.    Other triggers at work may need to be identified as well.    I have asked the patient to discuss this further with her supervisor.      Dictated utilizing Dragon dictation.    This document was electronically signed by Woody Albert MD on 03/26/21 at 12:16 EDT

## 2021-08-23 RX ORDER — MONTELUKAST SODIUM 10 MG/1
TABLET ORAL
Qty: 90 TABLET | Refills: 0 | Status: SHIPPED | OUTPATIENT
Start: 2021-08-23 | End: 2021-11-19 | Stop reason: SDUPTHER

## 2021-11-19 ENCOUNTER — OFFICE VISIT (OUTPATIENT)
Dept: PULMONOLOGY | Facility: CLINIC | Age: 36
End: 2021-11-19

## 2021-11-19 VITALS
RESPIRATION RATE: 16 BRPM | HEART RATE: 93 BPM | BODY MASS INDEX: 41.66 KG/M2 | WEIGHT: 244 LBS | OXYGEN SATURATION: 98 % | DIASTOLIC BLOOD PRESSURE: 96 MMHG | HEIGHT: 64 IN | SYSTOLIC BLOOD PRESSURE: 142 MMHG

## 2021-11-19 DIAGNOSIS — J45.50 SEVERE PERSISTENT ASTHMA WITHOUT COMPLICATION: ICD-10-CM

## 2021-11-19 DIAGNOSIS — J30.89 OTHER ALLERGIC RHINITIS: ICD-10-CM

## 2021-11-19 DIAGNOSIS — R06.02 SHORTNESS OF BREATH: Primary | ICD-10-CM

## 2021-11-19 PROCEDURE — 99214 OFFICE O/P EST MOD 30 MIN: CPT | Performed by: INTERNAL MEDICINE

## 2021-11-19 PROCEDURE — 95012 NITRIC OXIDE EXP GAS DETER: CPT | Performed by: INTERNAL MEDICINE

## 2021-11-19 RX ORDER — MONTELUKAST SODIUM 10 MG/1
10 TABLET ORAL NIGHTLY
Qty: 90 TABLET | Refills: 2 | Status: SHIPPED | OUTPATIENT
Start: 2021-11-19 | End: 2022-05-27 | Stop reason: SDUPTHER

## 2021-11-19 RX ORDER — ALBUTEROL SULFATE 90 UG/1
2 AEROSOL, METERED RESPIRATORY (INHALATION) EVERY 4 HOURS PRN
Qty: 54 G | Refills: 3 | Status: SHIPPED | OUTPATIENT
Start: 2021-11-19 | End: 2022-05-27 | Stop reason: SDUPTHER

## 2021-11-19 RX ORDER — AZELASTINE 1 MG/ML
1 SPRAY, METERED NASAL 2 TIMES DAILY PRN
Qty: 3 EACH | Refills: 2 | Status: SHIPPED | OUTPATIENT
Start: 2021-11-19 | End: 2022-05-27 | Stop reason: SDUPTHER

## 2021-11-19 NOTE — PROGRESS NOTES
"  Chief Complaint   Patient presents with   • Follow-up   • Shortness of Breath         Subjective   Tracy Pena is a 36 y.o. female.     History of Present Illness   Patient was evaluated today for follow up of asthma, allergic rhinitis and shortness of breath. Patient does not report any recent exacerbations requiring emergency room visits or hospitalizations.    Patient is compliant with pulmonary medicines, as prescribed. she is currently on Trelegy. she is using the rescue inhalers at least 4-5 times a week, over the past 2-3 weeks with the changing weather, although was using it a lot less before.     Patient says that she has been using her nasal sprays on a regular basis and describes no significant ongoing issues other than occasional congestion.         The following portions of the patient's history were reviewed and updated as appropriate: allergies, current medications, past family history, past medical history, past social history and past surgical history.    Review of Systems   Constitutional: Negative for chills and fever.   HENT: Negative for rhinorrhea, sinus pressure, sneezing and sore throat.    Respiratory: Positive for cough. Negative for shortness of breath and wheezing.    Psychiatric/Behavioral: Negative for sleep disturbance.       Objective   Visit Vitals  /96   Pulse 93   Resp 16   Ht 162.6 cm (64.02\")   Wt 111 kg (244 lb)   SpO2 98%   BMI 41.86 kg/m²       Physical Exam  Vitals reviewed.   Constitutional:       Appearance: She is well-developed.   Neck:      Vascular: No JVD.   Cardiovascular:      Rate and Rhythm: Normal rate and regular rhythm.   Pulmonary:      Effort: Pulmonary effort is normal.      Breath sounds: Normal breath sounds. No wheezing.   Musculoskeletal:      Cervical back: Neck supple.      Comments: Gait was normal.   Skin:     General: Skin is warm and dry.   Neurological:      Mental Status: She is alert and oriented to person, place, and time. "         Assessment/Plan   Diagnoses and all orders for this visit:    1. Shortness of breath (Primary)    2. Severe persistent asthma without complication    3. Other allergic rhinitis    Other orders  -     montelukast (SINGULAIR) 10 MG tablet; Take 1 tablet by mouth Every Night.  Dispense: 90 tablet; Refill: 2  -     Fluticasone-Umeclidin-Vilant (Trelegy Ellipta) 200-62.5-25 MCG/INH inhaler; Inhale 1 puff Daily. Rinse mouth with water after use.  Dispense: 180 each; Refill: 3  -     azelastine (ASTELIN) 0.1 % nasal spray; 1 spray into the nostril(s) as directed by provider 2 (Two) Times a Day As Needed for Rhinitis or Allergies. Use in each nostril as directed  Dispense: 3 each; Refill: 2  -     albuterol sulfate  (90 Base) MCG/ACT inhaler; Inhale 2 puffs Every 4 (Four) Hours As Needed for Wheezing.  Dispense: 54 g; Refill: 3           Return in about 6 months (around 5/19/2022) for For Brittney Mauricio).    DISCUSSION (if any):  FeNO level was 16 today.    It appears that her symptoms of asthma are under adequate control with the current regimen.    Patient's medications for underlying asthma were reviewed in great detail.    Patient was informed about the black box warning for Singulair regarding suicidal thoughts and tendencies.  she denies any ongoing issues for now.     Any needed adjustments to her pulmonary medications, either for clinical or insurance coverage reasons, have been made and are reflected in the orders.    Compliance with medications stressed.     Side effects of prescribed medications discussed with the patient.    The need to continue to be aware of triggers that may cause asthma exacerbation versus progression of disease, was also discussed.    Patient was advised to continue her nasal spray, especially given improvement in symptoms overall.    I have discussed asthma action plan with her.    The patient was asked to call this office if the symptoms worsen.        Dictated utilizing  [employer] dictation.    This document was electronically signed by Woody Albert MD on 11/19/21 at 09:45 EST

## 2021-11-29 DIAGNOSIS — J45.50 SEVERE PERSISTENT ASTHMA WITHOUT COMPLICATION: Primary | ICD-10-CM

## 2022-05-27 ENCOUNTER — OFFICE VISIT (OUTPATIENT)
Dept: PULMONOLOGY | Facility: CLINIC | Age: 37
End: 2022-05-27

## 2022-05-27 VITALS
OXYGEN SATURATION: 98 % | DIASTOLIC BLOOD PRESSURE: 80 MMHG | BODY MASS INDEX: 42.34 KG/M2 | SYSTOLIC BLOOD PRESSURE: 122 MMHG | WEIGHT: 248 LBS | HEART RATE: 99 BPM | HEIGHT: 64 IN | RESPIRATION RATE: 18 BRPM

## 2022-05-27 DIAGNOSIS — E66.01 MORBID OBESITY: ICD-10-CM

## 2022-05-27 DIAGNOSIS — Z57.9 SEVERE PERSISTENT OCCUPATIONAL ASTHMA WITHOUT COMPLICATION: ICD-10-CM

## 2022-05-27 DIAGNOSIS — J45.50 SEVERE PERSISTENT OCCUPATIONAL ASTHMA WITHOUT COMPLICATION: ICD-10-CM

## 2022-05-27 DIAGNOSIS — J30.89 OTHER ALLERGIC RHINITIS: Primary | ICD-10-CM

## 2022-05-27 PROCEDURE — 99214 OFFICE O/P EST MOD 30 MIN: CPT | Performed by: NURSE PRACTITIONER

## 2022-05-27 RX ORDER — ALBUTEROL SULFATE 90 UG/1
2 AEROSOL, METERED RESPIRATORY (INHALATION) EVERY 4 HOURS PRN
Qty: 54 G | Refills: 3 | Status: SHIPPED | OUTPATIENT
Start: 2022-05-27

## 2022-05-27 RX ORDER — AZELASTINE 1 MG/ML
1 SPRAY, METERED NASAL 2 TIMES DAILY PRN
Qty: 3 EACH | Refills: 2 | Status: SHIPPED | OUTPATIENT
Start: 2022-05-27

## 2022-05-27 RX ORDER — GALCANEZUMAB 120 MG/ML
120 INJECTION, SOLUTION SUBCUTANEOUS
COMMUNITY
Start: 2022-05-22

## 2022-05-27 RX ORDER — PSEUDOEPHEDRINE HCL 30 MG
TABLET ORAL
Qty: 30 TABLET | Refills: 1 | Status: SHIPPED | OUTPATIENT
Start: 2022-05-27

## 2022-05-27 RX ORDER — MONTELUKAST SODIUM 10 MG/1
10 TABLET ORAL NIGHTLY
Qty: 90 TABLET | Refills: 2 | Status: SHIPPED | OUTPATIENT
Start: 2022-05-27

## 2022-05-27 SDOH — HEALTH STABILITY - PHYSICAL HEALTH: OCCUPATIONAL EXPOSURE TO UNSPECIFIED RISK FACTOR: Z57.9

## 2022-05-27 NOTE — PROGRESS NOTES
"Chief Complaint   Patient presents with   • Follow-up   • Breathing Problem         Subjective   Tracy Pena is a 37 y.o. female.     History of Present Illness   Patient is here today for follow up of asthma and shortness of breath.     Patient says that since the last office visit she has had no recent exacerbations. she denies any emergency room visits or hospitalizations, due to her asthma.     She has noticed significant increased in the nonproductive cough since she has returned to work.     The patient says that she is compliant with pulmonary medicines, as prescribed. She uses Trelegy daily.     She uses rescue inhaler 1-2 times a day when she is at work. She may need it the day after working in the office too.  Otherwise, she does not have to use it often.    She has not needed neb in awhile.      The following portions of the patient's history were reviewed and updated as appropriate: allergies, current medications, past family history, past medical history, past social history and past surgical history.    Review of Systems   HENT: Negative for sinus pain.    Respiratory: Positive for cough.    Psychiatric/Behavioral: Negative for sleep disturbance.       Objective   Visit Vitals  /80   Pulse 99   Resp 18   Ht 162.6 cm (64.02\")   Wt 112 kg (248 lb)   SpO2 98%   BMI 42.54 kg/m²         Physical Exam  Vitals reviewed.   HENT:      Head: Atraumatic.      Mouth/Throat:      Mouth: Mucous membranes are moist.   Eyes:      Extraocular Movements: Extraocular movements intact.   Cardiovascular:      Rate and Rhythm: Normal rate and regular rhythm.   Pulmonary:      Effort: Pulmonary effort is normal. No respiratory distress.      Breath sounds: No wheezing.   Musculoskeletal:      Cervical back: Neck supple.      Comments: Gait normal.   Skin:     General: Skin is warm.   Neurological:      Mental Status: She is alert and oriented to person, place, and time.           Assessment & Plan   Diagnoses and " all orders for this visit:    1. Other allergic rhinitis (Primary)    2. Severe persistent occupational asthma without complication    3. Morbid obesity (HCC)    Other orders  -     pseudoephedrine (Sudafed) 30 MG tablet; Take 1 tablet daily  Dispense: 30 tablet; Refill: 1  -     montelukast (SINGULAIR) 10 MG tablet; Take 1 tablet by mouth Every Night.  Dispense: 90 tablet; Refill: 2  -     Fluticasone-Umeclidin-Vilant (Trelegy Ellipta) 200-62.5-25 MCG/INH inhaler; Inhale 1 puff Daily. Rinse mouth with water after use.  Dispense: 180 each; Refill: 3  -     azelastine (ASTELIN) 0.1 % nasal spray; 1 spray into the nostril(s) as directed by provider 2 (Two) Times a Day As Needed for Rhinitis or Allergies. Use in each nostril as directed  Dispense: 3 each; Refill: 2  -     albuterol sulfate  (90 Base) MCG/ACT inhaler; Inhale 2 puffs Every 4 (Four) Hours As Needed for Wheezing.  Dispense: 54 g; Refill: 3           Return in about 6 months (around 11/27/2022) for Recheck, For Dr. Albert.    DISCUSSION (if any):  Her symptoms of asthma are under poor control when she's at work, but otherwise her asthma symptoms are fairly good controlled.     She should continue Trelegy, Singulair, and the rescue medication as directed.    On days that she has to go into the office for work, I have asked her to try an antihistamine such as Claritin and 30 mg of Sudafed.  She states in the past Sudafed has made her hyper but she was unsure of the dose.  This is a low-dose of Sudafed so we will see if she has a better toleration of the medication.    She should continue using Flonase and Astelin.    I have asked her to try working from home as many days a week as possible.  She states that right now she can work from home up to 3 days a week.  She is usually working at least 2 days a week at home.     Patient's medications for underlying asthma were reviewed with her in great detail.    Any needed adjustments to her pulmonary  medications, either for clinical or insurance coverage reasons, have been made and are reflected in the orders.    Side effects of prescribed medications discussed with the patient.    Asthma action plan with discussed with her.    The patient was asked to call this office if the symptoms worsen.      Dictated utilizing Dragon dictation.    This document was electronically signed by BRYANT Spring May 27, 2022  10:00 EDT

## 2022-11-11 ENCOUNTER — OFFICE VISIT (OUTPATIENT)
Dept: PULMONOLOGY | Facility: CLINIC | Age: 37
End: 2022-11-11

## 2022-11-11 VITALS
OXYGEN SATURATION: 99 % | DIASTOLIC BLOOD PRESSURE: 82 MMHG | SYSTOLIC BLOOD PRESSURE: 130 MMHG | HEART RATE: 98 BPM | WEIGHT: 240 LBS | HEIGHT: 64 IN | BODY MASS INDEX: 40.97 KG/M2

## 2022-11-11 DIAGNOSIS — J30.89 OTHER ALLERGIC RHINITIS: ICD-10-CM

## 2022-11-11 DIAGNOSIS — J45.50 SEVERE PERSISTENT ASTHMA WITHOUT COMPLICATION: Primary | ICD-10-CM

## 2022-11-11 PROCEDURE — 95012 NITRIC OXIDE EXP GAS DETER: CPT | Performed by: INTERNAL MEDICINE

## 2022-11-11 PROCEDURE — 99214 OFFICE O/P EST MOD 30 MIN: CPT | Performed by: INTERNAL MEDICINE

## 2022-11-11 RX ORDER — FLUTICASONE FUROATE AND VILANTEROL 200; 25 UG/1; UG/1
1 POWDER RESPIRATORY (INHALATION) DAILY
Qty: 180 EACH | Refills: 2 | Status: SHIPPED | OUTPATIENT
Start: 2022-11-11 | End: 2023-01-30

## 2022-11-11 NOTE — PROGRESS NOTES
"  Chief Complaint   Patient presents with   • Follow-up   • Shortness of Breath       Subjective   Tracy Pena is a 37 y.o. female.     History of Present Illness   Patient was evaluated today for follow up of asthma, and allergic rhinitis. Patient does not report any recent exacerbations requiring emergency room visits or hospitalizations.    Patient is compliant with pulmonary medicines, as prescribed.     she is currently on Trelegy. she is using the rescue inhalers minimally.     Patient says that she has been using her nasal sprays on a regular basis and describes no significant ongoing issues other than occasional congestion.       The following portions of the patient's history were reviewed and updated as appropriate: allergies, current medications, past family history, past medical history, past social history and past surgical history.    Review of Systems   Respiratory: Positive for cough. Negative for apnea, choking, chest tightness, shortness of breath, wheezing and stridor.    Cardiovascular: Negative for chest pain, palpitations and leg swelling.       Objective   Visit Vitals  /82   Pulse 98   Ht 162.6 cm (64.02\")   Wt 109 kg (240 lb)   SpO2 99%   BMI 41.17 kg/m²       Physical Exam  Vitals reviewed.   Constitutional:       Appearance: She is well-developed.   Neck:      Vascular: No JVD.   Cardiovascular:      Rate and Rhythm: Normal rate and regular rhythm.   Pulmonary:      Effort: Pulmonary effort is normal.      Breath sounds: Normal breath sounds. No wheezing.   Musculoskeletal:      Cervical back: Neck supple.      Comments: Gait was normal.   Skin:     General: Skin is warm and dry.   Neurological:      Mental Status: She is alert and oriented to person, place, and time.           Assessment & Plan   Diagnoses and all orders for this visit:    1. Severe persistent asthma without complication (Primary)    2. Other allergic rhinitis    Other orders  -     Fluticasone " Furoate-Vilanterol (BREO ELLIPTA) 200-25 MCG/ACT inhaler; Inhale 1 puff Daily. Rinse mouth with water after use.  Dispense: 180 each; Refill: 2           Return in about 6 months (around 5/11/2023) for Recheck, For Brittney Mauricio).    DISCUSSION (if any):  FeNO level was 15 today.    Peak flow was 310 LPM today.    It appears that her symptoms of asthma are under good control with the current regimen.    Patient's medications for underlying asthma were reviewed in great detail.    Patient was informed about the black box warning for JDCPhosphate regarding suicidal thoughts and tendencies.  she denies any ongoing issues for now.     Since the patient is doing fairly well as far as respiratory symptoms are concerned, I have recommended that she discontinued using Trelegy and we will start the patient on Breo instead.    The patient was asked to restart Trelegy on a regular basis if her symptoms worsen or if she requires her rescue inhaler more than 4 to 5 times a week or if she has any night time symptoms or if she has any exacerbation while she is not on regular schedule dose of a maintenance inhaler.     Compliance with medications stressed.     Side effects of prescribed medications discussed with the patient.    The need to continue to be aware of triggers that may cause asthma exacerbation versus progression of disease, was also discussed.    I have discussed asthma action plan with her.    The patient was asked to call this office if the symptoms worsen.    Patient was advised to continue her nasal spray, especially given improvement in symptoms overall.        Dictated utilizing Dragon dictation.    This document was electronically signed by Woody Albert MD on 11/11/22 at 10:26 EST

## 2022-11-15 DIAGNOSIS — J45.50 SEVERE PERSISTENT ASTHMA WITHOUT COMPLICATION: Primary | ICD-10-CM

## 2023-01-30 RX ORDER — FLUTICASONE FUROATE, UMECLIDINIUM BROMIDE AND VILANTEROL TRIFENATATE 200; 62.5; 25 UG/1; UG/1; UG/1
200 POWDER RESPIRATORY (INHALATION) DAILY
Qty: 3 EACH | Refills: 3 | Status: SHIPPED | OUTPATIENT
Start: 2023-01-30

## 2023-01-30 RX ORDER — PREDNISONE 20 MG/1
TABLET ORAL
Qty: 10 TABLET | Refills: 0 | Status: SHIPPED | OUTPATIENT
Start: 2023-01-30

## 2023-01-30 NOTE — TELEPHONE ENCOUNTER
Patient called stated that the Breo wasn't helping her. She has asked to be put back on Trelegy. Per last note from  will change it to trelegy.

## 2023-04-04 RX ORDER — MONTELUKAST SODIUM 10 MG/1
TABLET ORAL
Qty: 90 TABLET | Refills: 2 | OUTPATIENT
Start: 2023-04-04

## 2023-04-24 RX ORDER — MONTELUKAST SODIUM 10 MG/1
10 TABLET ORAL NIGHTLY
Qty: 90 TABLET | Refills: 2 | Status: SHIPPED | OUTPATIENT
Start: 2023-04-24

## 2024-02-09 ENCOUNTER — OFFICE VISIT (OUTPATIENT)
Dept: PULMONOLOGY | Facility: CLINIC | Age: 39
End: 2024-02-09
Payer: COMMERCIAL

## 2024-02-09 VITALS
SYSTOLIC BLOOD PRESSURE: 118 MMHG | BODY MASS INDEX: 32.61 KG/M2 | OXYGEN SATURATION: 98 % | RESPIRATION RATE: 14 BRPM | HEIGHT: 64 IN | DIASTOLIC BLOOD PRESSURE: 68 MMHG | WEIGHT: 191 LBS | HEART RATE: 78 BPM

## 2024-02-09 DIAGNOSIS — J30.89 OTHER ALLERGIC RHINITIS: ICD-10-CM

## 2024-02-09 DIAGNOSIS — J45.50 SEVERE PERSISTENT ASTHMA WITHOUT COMPLICATION: Primary | ICD-10-CM

## 2024-02-09 PROCEDURE — 99214 OFFICE O/P EST MOD 30 MIN: CPT | Performed by: INTERNAL MEDICINE

## 2024-02-09 RX ORDER — ALBUTEROL SULFATE 90 UG/1
2 AEROSOL, METERED RESPIRATORY (INHALATION) EVERY 4 HOURS PRN
Qty: 54 G | Refills: 3 | Status: SHIPPED | OUTPATIENT
Start: 2024-02-09

## 2024-02-09 RX ORDER — MONTELUKAST SODIUM 10 MG/1
10 TABLET ORAL NIGHTLY
Qty: 90 TABLET | Refills: 2 | Status: SHIPPED | OUTPATIENT
Start: 2024-02-09

## 2024-02-09 RX ORDER — DEXTROAMPHETAMINE SACCHARATE, AMPHETAMINE ASPARTATE MONOHYDRATE, DEXTROAMPHETAMINE SULFATE AND AMPHETAMINE SULFATE 3.75; 3.75; 3.75; 3.75 MG/1; MG/1; MG/1; MG/1
15 CAPSULE, EXTENDED RELEASE ORAL DAILY
COMMUNITY
Start: 2024-02-05

## 2024-02-09 RX ORDER — AZELASTINE 1 MG/ML
1 SPRAY, METERED NASAL 2 TIMES DAILY PRN
Qty: 3 EACH | Refills: 2 | Status: SHIPPED | OUTPATIENT
Start: 2024-02-09

## 2024-02-09 RX ORDER — PREDNISONE 20 MG/1
TABLET ORAL
Qty: 10 TABLET | Refills: 0 | Status: SHIPPED | OUTPATIENT
Start: 2024-02-09

## 2024-02-09 RX ORDER — FLUTICASONE FUROATE, UMECLIDINIUM BROMIDE AND VILANTEROL TRIFENATATE 200; 62.5; 25 UG/1; UG/1; UG/1
200 POWDER RESPIRATORY (INHALATION) DAILY
Qty: 3 EACH | Refills: 3 | Status: SHIPPED | OUTPATIENT
Start: 2024-02-09

## 2024-02-09 NOTE — PROGRESS NOTES
"  Chief Complaint   Patient presents with    Breathing Problem    Follow-up       Subjective   Tracy Pena is a 39 y.o. female.   Patient was evaluated today for follow up of asthma, and allergic rhinitis.     Patient reports recent exacerbations requiring antibiotics and steroids.  The patient was evaluated in urgent care for her recent exacerbation.    Patient is compliant with pulmonary medicines, as prescribed.     she is currently on Trelegy. she is using the rescue inhalers 3-4 times in last 2 weeks.      Patient says that she has been using her nasal sprays on a regular basis and describes no significant ongoing issues other than occasional congestion.     The following portions of the patient's history were reviewed and updated as appropriate: allergies, current medications, past family history, past medical history, past social history, and past surgical history.    Review of Systems   HENT:  Positive for sore throat. Negative for sinus pressure and sneezing.    Respiratory:  Positive for cough and wheezing (some). Negative for chest tightness and shortness of breath.        Objective   Visit Vitals  /68   Pulse 78   Resp 14   Ht 162.6 cm (64\") Comment: pt reported   Wt 86.6 kg (191 lb)   SpO2 98%   BMI 32.79 kg/m²         BMI Readings from Last 3 Encounters:   02/09/24 32.79 kg/m²   06/30/23 38.11 kg/m²   11/11/22 41.17 kg/m²       Physical Exam  Vitals reviewed.   Constitutional:       Appearance: She is well-developed.   HENT:      Head: Normocephalic and atraumatic.      Nose:      Comments: Nasal erythema noted.     Mouth/Throat:      Comments: Oropharynx was somewhat cobblestoned.  Eyes:      Extraocular Movements: Extraocular movements intact.   Cardiovascular:      Rate and Rhythm: Normal rate.   Pulmonary:      Comments: Somewhat hyperresonant to percussion.  Somewhat decreased air entry.  No obvious wheezing noted.   Musculoskeletal:      Cervical back: Neck supple.   Neurological:      " Mental Status: She is alert.           Assessment & Plan   Diagnoses and all orders for this visit:    1. Severe persistent asthma without complication (Primary)  -     Complete PFT - Pre & Post Bronchodilator; Future  -     Nitric Oxide; Future    2. Other allergic rhinitis    Other orders  -     Fluticasone-Umeclidin-Vilant (Trelegy Ellipta) 200-62.5-25 MCG/ACT aerosol powder ; Inhale 200 mcg Daily.  Dispense: 3 each; Refill: 3  -     azelastine (ASTELIN) 0.1 % nasal spray; 1 spray into the nostril(s) as directed by provider 2 (Two) Times a Day As Needed for Rhinitis or Allergies. Use in each nostril as directed  Dispense: 3 each; Refill: 2  -     albuterol sulfate  (90 Base) MCG/ACT inhaler; Inhale 2 puffs Every 4 (Four) Hours As Needed for Wheezing.  Dispense: 54 g; Refill: 3  -     montelukast (SINGULAIR) 10 MG tablet; Take 1 tablet by mouth Every Night.  Dispense: 90 tablet; Refill: 2  -     predniSONE (DELTASONE) 20 MG tablet; Take 2 tablets daily for 5 days.  Dispense: 10 tablet; Refill: 0           Return in about 27 weeks (around 8/16/2024) for Recheck, PFT F/U, For Brittney Chappell), ....Also 14 mths w/ Dr. Albert ([place]).    DISCUSSION (if any):  It appears that her symptoms of asthma are under adequate control with the current regimen.    Patient's medications for underlying asthma were reviewed in great detail.    Patient was informed about the black box warning for Singulair regarding suicidal thoughts and tendencies.  she denies any ongoing issues for now.     Any needed adjustments to her pulmonary medications, either for clinical or insurance coverage reasons, have been made and are reflected in the orders.    Compliance with medications stressed.     Side effects of prescribed medications discussed with the patient.    The need to continue to be aware of triggers that may cause asthma exacerbation versus progression of disease, was also discussed.    I have discussed asthma action plan with  her.    Prednisone was prescribed as part of asthma action plan.     Patient was advised to continue her nasal spray, especially given improvement in symptoms overall.    The patient was asked to call this office if the symptoms worsen.    Vaccination status addressed.    Up-to-date with influenza vaccinations.     Up-to-date with pneumonia vaccinations.       Dictated utilizing Dragon dictation.    This document was electronically signed by Woody Albert MD on 02/09/24 at 11:22 EST

## 2024-08-15 ENCOUNTER — OFFICE VISIT (OUTPATIENT)
Dept: PULMONOLOGY | Facility: CLINIC | Age: 39
End: 2024-08-15
Payer: COMMERCIAL

## 2024-08-15 VITALS
RESPIRATION RATE: 14 BRPM | BODY MASS INDEX: 33.46 KG/M2 | OXYGEN SATURATION: 95 % | DIASTOLIC BLOOD PRESSURE: 72 MMHG | HEIGHT: 64 IN | WEIGHT: 196 LBS | SYSTOLIC BLOOD PRESSURE: 118 MMHG | HEART RATE: 87 BPM

## 2024-08-15 DIAGNOSIS — J30.89 OTHER ALLERGIC RHINITIS: ICD-10-CM

## 2024-08-15 DIAGNOSIS — J45.50 SEVERE PERSISTENT ASTHMA WITHOUT COMPLICATION: ICD-10-CM

## 2024-08-15 DIAGNOSIS — J45.50 SEVERE PERSISTENT ASTHMA WITHOUT COMPLICATION: Primary | ICD-10-CM

## 2024-08-15 PROCEDURE — 99214 OFFICE O/P EST MOD 30 MIN: CPT | Performed by: NURSE PRACTITIONER

## 2024-08-15 RX ORDER — FLUTICASONE FUROATE, UMECLIDINIUM BROMIDE AND VILANTEROL TRIFENATATE 200; 62.5; 25 UG/1; UG/1; UG/1
1 POWDER RESPIRATORY (INHALATION)
Qty: 180 EACH | Refills: 1 | Status: SHIPPED | OUTPATIENT
Start: 2024-08-15

## 2024-08-15 RX ORDER — AZELASTINE 1 MG/ML
1 SPRAY, METERED NASAL 2 TIMES DAILY PRN
Qty: 3 EACH | Refills: 2 | Status: SHIPPED | OUTPATIENT
Start: 2024-08-15

## 2024-08-15 RX ORDER — PREDNISONE 20 MG/1
TABLET ORAL
Qty: 10 TABLET | Refills: 0 | Status: SHIPPED | OUTPATIENT
Start: 2024-08-15

## 2024-08-15 RX ORDER — ALBUTEROL SULFATE 90 UG/1
2 AEROSOL, METERED RESPIRATORY (INHALATION) EVERY 4 HOURS PRN
Qty: 54 G | Refills: 3 | Status: SHIPPED | OUTPATIENT
Start: 2024-08-15

## 2024-08-15 RX ORDER — MONTELUKAST SODIUM 10 MG/1
10 TABLET ORAL NIGHTLY
Qty: 90 TABLET | Refills: 2 | Status: SHIPPED | OUTPATIENT
Start: 2024-08-15

## 2024-08-15 NOTE — PROGRESS NOTES
"Chief Complaint   Patient presents with    Breathing Problem    Follow-up         Subjective   Tracy Pena is a 39 y.o. female.   Patient is here today for follow up of asthma and shortness of breath.     She used prednisone 3-4 weeks ago. She had just returned from the beach and was in had to be in her office several days that week and had increased SOB and cough.     The patient says that she is compliant with pulmonary medicines, as prescribed. She uses Trelegy daily. She uses LORENZA intermittently especially with hot/humid weather.     She takes singulair nightly. She uses nasal spray as needed.    She was started on Adderall due to ADD. She does not like taking it but has a lot of difficulty concentrating when she is in meetings and she has lot of them.       The following portions of the patient's history were reviewed and updated as appropriate: allergies, current medications, past family history, past medical history, past social history, and past surgical history.      Review of Systems   HENT:  Negative for sinus pressure, sneezing and sore throat.    Respiratory:  Positive for cough and chest tightness (some). Negative for shortness of breath and wheezing.        Objective   Visit Vitals  /72   Pulse 87   Resp 14   Ht 162.6 cm (64\") Comment: pt reported   Wt 88.9 kg (196 lb)   SpO2 95%   BMI 33.64 kg/m²         Physical Exam  Vitals reviewed.   HENT:      Head: Atraumatic.      Mouth/Throat:      Mouth: Mucous membranes are moist.   Eyes:      Extraocular Movements: Extraocular movements intact.   Cardiovascular:      Rate and Rhythm: Normal rate and regular rhythm.   Pulmonary:      Effort: Pulmonary effort is normal. No respiratory distress.      Breath sounds: No wheezing.   Skin:     General: Skin is warm.   Neurological:      Mental Status: She is alert and oriented to person, place, and time.           Assessment & Plan   Diagnoses and all orders for this visit:    1. Severe persistent " asthma without complication (Primary)    2. Other allergic rhinitis    Other orders  -     albuterol sulfate  (90 Base) MCG/ACT inhaler; Inhale 2 puffs Every 4 (Four) Hours As Needed for Wheezing.  Dispense: 54 g; Refill: 3  -     azelastine (ASTELIN) 0.1 % nasal spray; 1 spray into the nostril(s) as directed by provider 2 (Two) Times a Day As Needed for Rhinitis or Allergies. Use in each nostril as directed  Dispense: 3 each; Refill: 2  -     Fluticasone-Umeclidin-Vilant (Trelegy Ellipta) 200-62.5-25 MCG/ACT inhaler; Inhale 1 puff Daily.  Dispense: 180 each; Refill: 1  -     montelukast (SINGULAIR) 10 MG tablet; Take 1 tablet by mouth Every Night.  Dispense: 90 tablet; Refill: 2  -     predniSONE (DELTASONE) 20 MG tablet; Take 2 tablets daily for 5 days.  Dispense: 10 tablet; Refill: 0           Return for keep appt in April.    DISCUSSION (if any):  PFT today consistent with no obstruction.  No restriction without suggestion of air trapping.  Preserved diffusion capacity.     Latest Reference Range & Units Most Recent   Eosinophils Absolute 0.00 - 0.40 10*3/mm3 0.16  3/4/21 15:56      Latest Reference Range & Units Most Recent   Cat Dander Class 0 kU/L <0.10  2/10/20 15:31   Dog Dander, IgE Class 0 kU/L <0.10  2/10/20 15:31   English Plantain Class 0 kU/L <0.10  2/10/20 15:31   Nettle Class 0 kU/L <0.10  2/10/20 15:31   Sweet Gum Class 0 kU/L <0.10  2/10/20 15:31   [place] Grass Class 0 kU/L <0.10  2/10/20 15:31   [place] Grass Class 0 kU/L <0.10  2/10/20 15:31   [place] Bluegrass IgE Class 0 kU/L <0.10  2/10/20 15:31   Sheep [place] Class 0 kU/L <0.10  2/10/20 15:31   [place] Grass Class 0 kU/L <0.10  2/10/20 15:31   Cockroach, American Class 0/I kU/L 0.16 !  2/10/20 15:31   D. farinae (dust mite) Class 0 kU/L <0.10  2/10/20 15:31   D. pteronyssinus (dust mite) Class 0 kU/L <0.10  2/10/20 15:31   Class Description  Comment  2/10/20 15:31   Aspergillus fumigatus Class 0 kU/L <0.10  2/10/20 15:31    Cladosporium herbarum Class 0 kU/L <0.10  2/10/20 15:31   Mucor racemosus Class 0 kU/L <0.10  2/10/20 15:31   Stemphylium herbarum Class 0 kU/L <0.10  2/10/20 15:31   Hubbard, Mountain Class 0 kU/L <0.10  2/10/20 15:31   Elm, American Class 0 kU/L <0.10  2/10/20 15:31   Hazelnut Tree Class 0 kU/L <0.10  2/10/20 15:31   [place], White Class 0 kU/L <0.10  2/10/20 15:31   Maple/Millard Class 0 kU/L <0.10  2/10/20 15:31   Maple [place] Class 0 kU/L <0.10  2/10/20 15:31   [place], White Class 0 kU/L <0.10  2/10/20 15:31   White [place] Class 0 kU/L <0.10  2/10/20 15:31   Mugwort Class 0 kU/L <0.10  2/10/20 15:31   Pigweed, Rough/Common Class 0 kU/L <0.10  2/10/20 15:31   Ragweed, Common/Short Class 0 kU/L <0.10  2/10/20 15:31     I feel many of her issues are allergy related even though no elevated allergy levels in the past.     Her symptoms of asthma are under adequate control at this time. Continue Trelegy daily as she has noticed improvement with this inhaler.  Continue LORENZA as needed.     Prednisone script provided in case of asthma exacerbation.     Patient's medications for underlying asthma were reviewed with her in great detail.    Any needed adjustments to her pulmonary medications, either for clinical or insurance coverage reasons, have been made and are reflected in the orders.    Side effects of prescribed medications discussed with the patient.    Asthma action plan with discussed with her.    The patient was asked to call this office if the symptoms worsen.       Dictated utilizing Dragon dictation.    This document was electronically signed by BRYANT Spring August 15, 2024  13:09 EDT

## 2024-08-19 DIAGNOSIS — J45.50 SEVERE PERSISTENT ASTHMA WITHOUT COMPLICATION: ICD-10-CM

## 2024-11-21 ENCOUNTER — TRANSCRIBE ORDERS (OUTPATIENT)
Dept: ADMINISTRATIVE | Facility: HOSPITAL | Age: 39
End: 2024-11-21
Payer: COMMERCIAL

## 2024-11-21 DIAGNOSIS — Z12.31 SCREENING MAMMOGRAM, ENCOUNTER FOR: Primary | ICD-10-CM

## 2024-12-09 ENCOUNTER — HOSPITAL ENCOUNTER (OUTPATIENT)
Dept: MAMMOGRAPHY | Facility: HOSPITAL | Age: 39
Discharge: HOME | End: 2024-12-09
Admitting: INTERNAL MEDICINE
Payer: COMMERCIAL

## 2024-12-09 DIAGNOSIS — Z12.31 SCREENING MAMMOGRAM, ENCOUNTER FOR: ICD-10-CM

## 2024-12-09 PROCEDURE — 77067 SCR MAMMO BI INCL CAD: CPT

## 2024-12-09 PROCEDURE — 77063 BREAST TOMOSYNTHESIS BI: CPT

## 2024-12-23 RX ORDER — PREDNISONE 20 MG/1
TABLET ORAL
Qty: 10 TABLET | Refills: 0 | Status: SHIPPED | OUTPATIENT
Start: 2024-12-23

## 2025-01-02 ENCOUNTER — HOSPITAL ENCOUNTER (OUTPATIENT)
Dept: ULTRASOUND IMAGING | Facility: HOSPITAL | Age: 40
Discharge: HOME | End: 2025-01-02
Payer: COMMERCIAL

## 2025-01-02 ENCOUNTER — HOSPITAL ENCOUNTER (OUTPATIENT)
Dept: MAMMOGRAPHY | Facility: HOSPITAL | Age: 40
Discharge: HOME | End: 2025-01-02
Payer: COMMERCIAL

## 2025-01-02 DIAGNOSIS — R92.8 ABNORMAL MAMMOGRAM: ICD-10-CM

## 2025-01-02 PROCEDURE — 76642 ULTRASOUND BREAST LIMITED: CPT

## 2025-01-02 PROCEDURE — 77066 DX MAMMO INCL CAD BI: CPT

## 2025-01-02 PROCEDURE — G0279 TOMOSYNTHESIS, MAMMO: HCPCS

## 2025-01-15 ENCOUNTER — HOSPITAL ENCOUNTER (OUTPATIENT)
Dept: MAMMOGRAPHY | Facility: HOSPITAL | Age: 40
Discharge: HOME | End: 2025-01-15
Payer: COMMERCIAL

## 2025-01-15 DIAGNOSIS — R92.8 ABNORMAL MAMMOGRAM OF RIGHT BREAST: ICD-10-CM

## 2025-01-15 DIAGNOSIS — R92.8 ABNORMAL MAMMOGRAM OF LEFT BREAST: ICD-10-CM

## 2025-01-15 PROCEDURE — A4648 IMPLANTABLE TISSUE MARKER: HCPCS

## 2025-01-17 LAB — REF LAB TEST METHOD: NORMAL

## 2025-01-21 ENCOUNTER — TELEPHONE (OUTPATIENT)
Dept: MAMMOGRAPHY | Facility: HOSPITAL | Age: 40
End: 2025-01-21
Payer: COMMERCIAL

## 2025-01-21 NOTE — TELEPHONE ENCOUNTER
Patient notified of breast biopsy results and recommendations. Encouraged patient to call with further needs. Patient to follow up with Dr. Espitia 1-27-25 and in 6 months for diagnostic mammogram.             .----- Message from Mikayla Humphrey sent at 1/21/2025  9:17 AM EST -----  PATHOLOGY:    LEFT 5:00 CALCIFICATIONS: Fibrocystic change. Focal pseudoangiomatous stromal hyperplasia. Benign calcifications. No malignancy identified.    RIGHT UPPER OUTER QUADRANT CALCIFICATIONS: Focal atypical lobular hyperplasia. Fibrocystic change. Benign calcifications. No malignancy identified.    The pathology results are felt to be concordant with the imaging findings. Recommend surgical consultation for further evaluation and management. Also recommend 6-month follow-up bilateral diagnostic mammogram.    The patient will be notified of the results and recommendations by our breast care nurse.

## 2025-01-27 ENCOUNTER — OFFICE VISIT (OUTPATIENT)
Dept: SURGERY | Facility: CLINIC | Age: 40
End: 2025-01-27
Payer: COMMERCIAL

## 2025-01-27 VITALS — BODY MASS INDEX: 33.63 KG/M2 | WEIGHT: 197 LBS | HEIGHT: 64 IN

## 2025-01-27 DIAGNOSIS — R92.8 ABNORMAL MAMMOGRAM: Primary | ICD-10-CM

## 2025-01-27 DIAGNOSIS — N60.91 ATYPICAL LOBULAR HYPERPLASIA OF RIGHT BREAST: ICD-10-CM

## 2025-01-27 PROCEDURE — 99204 OFFICE O/P NEW MOD 45 MIN: CPT | Performed by: SURGERY

## 2025-01-27 PROCEDURE — 76642 ULTRASOUND BREAST LIMITED: CPT | Performed by: SURGERY

## 2025-01-27 NOTE — PROGRESS NOTES
Subjective   Tracy Pena is a 39 y.o. female here today for pathology review.    History of Present Illness  Ms. Pena was seen in the office today for breast evaluation.  This is a 39-year-old female who underwent her initial baseline mammogram on 12/9/2024.  This demonstrated bilateral breast calcifications.  On 1/2/2025 the patient underwent bilateral diagnostic mammogram and left breast ultrasound.  Ultrasound demonstrated a few scattered cysts.  Calcifications were felt to be indeterminant and biopsy was recommended.  Patient had a bilateral stereotactic biopsy on 9/15/2025.  Calcifications in the left 5:00 were felt to be benign and concordant.  The right upper outer quadrant calcifications were noted to have focal atypical lobular hyperplasia.  It was not noted in the pathology whether the calcifications were noted with the atypia or not.  Patient denies a palpable mass.  There is remote history of nipple discharge.  Family history of breast cancer in a maternal grandmother.  Patient is premenopausal.  No Known Allergies  Current Outpatient Medications   Medication Sig Dispense Refill    albuterol sulfate  (90 Base) MCG/ACT inhaler Inhale 2 puffs Every 4 (Four) Hours As Needed for Wheezing. 54 g 3    amphetamine-dextroamphetamine XR (ADDERALL XR) 15 MG 24 hr capsule Take 1 capsule by mouth Daily      azelastine (ASTELIN) 0.1 % nasal spray 1 spray into the nostril(s) as directed by provider 2 (Two) Times a Day As Needed for Rhinitis or Allergies. Use in each nostril as directed 3 each 2    Emgality 120 MG/ML auto-injector pen Inject 1 mL under the skin into the appropriate area as directed. Once a month      Fluticasone-Umeclidin-Vilant (Trelegy Ellipta) 200-62.5-25 MCG/ACT inhaler Inhale 1 puff Daily. 180 each 1    montelukast (SINGULAIR) 10 MG tablet Take 1 tablet by mouth Every Night. 90 tablet 2    Ozempic, 0.25 or 0.5 MG/DOSE, 2 MG/3ML solution pen-injector       Xigduo XR 5-1000 MG tablet  "Take 1 tablet by mouth.      predniSONE (DELTASONE) 20 MG tablet Take 2 tablets daily for 5 days. (Patient not taking: Reported on 1/27/2025) 10 tablet 0     No current facility-administered medications for this visit.     Past Medical History:   Diagnosis Date    Asthma     Asthma 01/01/2018    Asthma, intrinsic     Bronchitis     Chronic bronchitis     Diabetes mellitus June 2023    Pleurisy     Pneumonia      Past Surgical History:   Procedure Laterality Date    CHOLECYSTECTOMY      KNEE MINI REVISION      SHOULDER ROTATOR CUFF REPAIR Right 05/20/2020    SHOULDER SURGERY Right 03/17/2021    Bicep Tendon Reattachment     TONSILLECTOMY         Pertinent Review of Systems:  Respiratory: no shortness of breath  Cardiovascular: no chest pain  Other pertinent:      Objective   Ht 162.6 cm (64\")   Wt 89.4 kg (197 lb)   BMI 33.81 kg/m²   Physical Exam  Well-developed well-nourished female  Neck:  No supraclavicular adenopathy  Lungs:  Respiratory effort normal. Auscultation: Clear, without wheezes, rhonchi, rales  Heart:  Regular rate and rhythm, without murmur, gallop, rub.  No pedal edema  Breasts: On visual inspection the breasts are symmetrical.  Examination of the right and left breast demonstrate dense tissue without discrete mass, skin change, axillary adenopathy.   Skin: Multiple tattoos    Procedures   Limited right breast Ultrasound    Indication: Status post right stereotactic biopsy, evaluation for clip placement    Description: With use of the 12.5 MHz transducer the area of clinical concern was scanned in multiple planes.    Findings: In the upper outer quadrant of the right breast approximately 3 cm directly medial to the skin incision site the biopsy clip is identified in 2 planes    Impression: Lesion amenable to ultrasound localization    Recommendation:  Followup as clinically indicated  Results/Data:  Imaging: Mammogram and ultrasound reports and images from 15/25, 1/2/2025 and 12/9/2024 were " reviewed.  I agree with the assessment  Pathology result was reviewed.    Assessment & Plan   Status post bilateral stereotactic biopsy for calcifications.  Focal atypical lobular hyperplasia in the right breast    Plan: Bilateral breast MRI to complete workup.  In addition have asked pathology to review slides and if atypia is associated with the calcifications then patient would require an excisional biopsy of this area.  If the atypia is an incidental finding to the calcifications then close follow-up would be recommended         Discussion/Summary    Time spent:     BMI is >= 30 and <35. (Class 1 Obesity). The following options were offered after discussion;: nutrition counseling/recommendations       Future Appointments   Date Time Provider Department Center   4/11/2025 11:30 AM Woody Albert MD [facility]         This document has been electronically signed by       January 27, 2025 10:00 EST      Please note that portions of this note were completed with a voice recognition program.

## 2025-01-28 LAB — REF LAB TEST METHOD: NORMAL

## 2025-01-30 ENCOUNTER — TELEPHONE (OUTPATIENT)
Dept: SURGERY | Facility: CLINIC | Age: 40
End: 2025-01-30
Payer: COMMERCIAL

## 2025-01-30 NOTE — TELEPHONE ENCOUNTER
Per Dr. Espitia I informed patient that her pathology has an addendum now. It says that some of the calcifications are associated with atypia and she will need a excision after her MRI. Went over that appoint with her and will track the results. We'll be in touch then.

## 2025-02-24 ENCOUNTER — HOSPITAL ENCOUNTER (OUTPATIENT)
Dept: MRI IMAGING | Facility: HOSPITAL | Age: 40
Discharge: HOME | End: 2025-02-24
Admitting: SURGERY
Payer: COMMERCIAL

## 2025-02-24 DIAGNOSIS — N60.91 ATYPICAL LOBULAR HYPERPLASIA OF RIGHT BREAST: ICD-10-CM

## 2025-02-24 LAB — CREAT BLDA-MCNC: 0.7 MG/DL (ref 0.6–1.3)

## 2025-02-24 PROCEDURE — 82565 ASSAY OF CREATININE: CPT

## 2025-02-24 PROCEDURE — A9577 INJ MULTIHANCE: HCPCS | Performed by: SURGERY

## 2025-02-24 PROCEDURE — C8906 MRI W/CONT, BREAST,  BI: HCPCS

## 2025-02-24 PROCEDURE — 25510000002 GADOBENATE DIMEGLUMINE 529 MG/ML SOLUTION: Performed by: SURGERY

## 2025-02-24 RX ADMIN — GADOBENATE DIMEGLUMINE 17 ML: 529 INJECTION, SOLUTION INTRAVENOUS at 14:10

## 2025-03-06 ENCOUNTER — TELEPHONE (OUTPATIENT)
Dept: SURGERY | Facility: CLINIC | Age: 40
End: 2025-03-06
Payer: COMMERCIAL

## 2025-03-07 ENCOUNTER — TELEPHONE (OUTPATIENT)
Dept: SURGERY | Facility: CLINIC | Age: 40
End: 2025-03-07
Payer: COMMERCIAL

## 2025-03-11 ENCOUNTER — TELEPHONE (OUTPATIENT)
Dept: SURGERY | Facility: CLINIC | Age: 40
End: 2025-03-11
Payer: COMMERCIAL

## 2025-03-11 NOTE — TELEPHONE ENCOUNTER
Per Dr. Espitia informed patient that there are calcifications in association with atypical lobular hyperplasia on her pathology report. These need to be surgically excised. Patient states she does NOT want to do anything at this time. She plans to talk to her PCP about this. I ask if she needed me to FAX a copy to her doctor and she said no she already had it. Patient says she'll call if she decided to proceed with the surgery.

## 2025-04-11 ENCOUNTER — OFFICE VISIT (OUTPATIENT)
Dept: PULMONOLOGY | Facility: CLINIC | Age: 40
End: 2025-04-11
Payer: COMMERCIAL

## 2025-04-11 VITALS
BODY MASS INDEX: 33.63 KG/M2 | WEIGHT: 197 LBS | OXYGEN SATURATION: 96 % | HEIGHT: 64 IN | SYSTOLIC BLOOD PRESSURE: 128 MMHG | DIASTOLIC BLOOD PRESSURE: 80 MMHG | HEART RATE: 90 BPM | RESPIRATION RATE: 16 BRPM

## 2025-04-11 DIAGNOSIS — J45.50 SEVERE PERSISTENT ASTHMA WITHOUT COMPLICATION: Primary | ICD-10-CM

## 2025-04-11 DIAGNOSIS — J30.89 OTHER ALLERGIC RHINITIS: ICD-10-CM

## 2025-04-11 PROCEDURE — 99214 OFFICE O/P EST MOD 30 MIN: CPT | Performed by: INTERNAL MEDICINE

## 2025-04-11 RX ORDER — FLUNISOLIDE 0.25 MG/ML
1 SOLUTION NASAL EVERY 12 HOURS
Qty: 25 ML | Refills: 5 | Status: SHIPPED | OUTPATIENT
Start: 2025-04-11

## 2025-04-11 RX ORDER — AZELASTINE 1 MG/ML
1 SPRAY, METERED NASAL 2 TIMES DAILY PRN
Qty: 3 EACH | Refills: 2 | Status: SHIPPED | OUTPATIENT
Start: 2025-04-11

## 2025-04-11 RX ORDER — FLUTICASONE FUROATE, UMECLIDINIUM BROMIDE AND VILANTEROL TRIFENATATE 200; 62.5; 25 UG/1; UG/1; UG/1
1 POWDER RESPIRATORY (INHALATION)
Qty: 180 EACH | Refills: 1 | Status: SHIPPED | OUTPATIENT
Start: 2025-04-11

## 2025-04-11 RX ORDER — MONTELUKAST SODIUM 10 MG/1
10 TABLET ORAL NIGHTLY
Qty: 90 TABLET | Refills: 2 | Status: SHIPPED | OUTPATIENT
Start: 2025-04-11

## 2025-04-11 NOTE — PROGRESS NOTES
"  Chief Complaint   Patient presents with    Breathing Problem    Follow-up         Subjective   Tracy Pena is a 40 y.o. female.   Patient was evaluated today for follow up of asthma, and allergic rhinitis.     Patient does not report any recent exacerbations requiring emergency room visits or hospitalizations.     Patient is compliant with pulmonary medicines, as prescribed.     she is currently on Trelegy. she is using the rescue inhalers minimally.     Patient says that she has been using the prescribed nasal sprays on a regular basis but continues to have worsening nasal congestion as well as occasional runny nose.      The following portions of the patient's history were reviewed and updated as appropriate: allergies, current medications, past family history, past medical history, past social history, and past surgical history.    Review of Systems   HENT:  Negative for sinus pressure, sneezing and sore throat.    Respiratory:  Negative for cough, chest tightness, shortness of breath and wheezing.        Objective   Visit Vitals  /80   Pulse 90   Resp 16   Ht 162.6 cm (64\") Comment: pt reported   Wt 89.4 kg (197 lb)   SpO2 96%   BMI 33.81 kg/m²       BMI Readings from Last 8 Encounters:   04/11/25 33.81 kg/m²   01/27/25 33.81 kg/m²   08/15/24 33.64 kg/m²   02/09/24 32.79 kg/m²   06/30/23 38.11 kg/m²   11/11/22 41.17 kg/m²   05/27/22 42.54 kg/m²   11/19/21 41.86 kg/m²       Physical Exam  Vitals reviewed.   Constitutional:       Appearance: She is well-developed.   HENT:      Head: Normocephalic and atraumatic.      Nose:      Comments: Nasal erythema noted.     Mouth/Throat:      Comments: Oropharynx was somewhat cobblestoned.  Eyes:      Extraocular Movements: Extraocular movements intact.   Cardiovascular:      Rate and Rhythm: Normal rate.   Pulmonary:      Comments: Somewhat hyperresonant to percussion.  Somewhat decreased air entry.  No obvious wheezing noted.   Musculoskeletal:      Cervical " back: Neck supple.   Neurological:      Mental Status: She is alert.             Assessment & Plan   Diagnoses and all orders for this visit:    1. Severe persistent asthma without complication (Primary)    2. Other allergic rhinitis    Other orders  -     flunisolide (NASALIDE) 25 MCG/ACT (0.025%) solution nasal spray; Inhale 1 spray Every 12 (Twelve) Hours.  Dispense: 25 mL; Refill: 5  -     Fluticasone-Umeclidin-Vilant (Trelegy Ellipta) 200-62.5-25 MCG/ACT inhaler; Inhale 1 puff Daily.  Dispense: 180 each; Refill: 1  -     montelukast (SINGULAIR) 10 MG tablet; Take 1 tablet by mouth Every Night.  Dispense: 90 tablet; Refill: 2  -     azelastine (ASTELIN) 0.1 % nasal spray; Administer 1 spray into the nostril(s) as directed by provider 2 (Two) Times a Day As Needed for Rhinitis or Allergies. Use in each nostril as directed  Dispense: 3 each; Refill: 2           Return in about 10 months (around 2/18/2026) for Recheck, For Brittney Mauricio).    DISCUSSION (if any):  It appears that her symptoms of asthma are under adequate control with the current regimen.    Patient's medications for underlying asthma were reviewed in great detail.    Patient was informed about the black box warning for Singulair regarding suicidal thoughts and tendencies.  she denies any ongoing issues for now.     Compliance with medications stressed.     Side effects of prescribed medications discussed with the patient.    The need to continue to be aware of triggers that may cause asthma exacerbation versus progression of disease, was also discussed.    Due to symptoms suggestive of poorly controlled allergic rhinitis, she was prescribed additional nasal spray with the advise to use all the recommended/prescribed nasal sprays on a regular basis.    If her symptoms do not improve, then we will consider ordering repeat IgE/RAST panel.    The patient was asked to call this office if the symptoms worsen.    Vaccination status addressed.    Up-to-date  with influenza vaccinations.     Up-to-date with pneumonia vaccinations.         Dictated utilizing Dragon dictation.    This document was electronically signed by Woody Albert MD on 04/11/25 at 11:46 EDT